# Patient Record
Sex: FEMALE | Race: WHITE | NOT HISPANIC OR LATINO | Employment: FULL TIME | URBAN - METROPOLITAN AREA
[De-identification: names, ages, dates, MRNs, and addresses within clinical notes are randomized per-mention and may not be internally consistent; named-entity substitution may affect disease eponyms.]

---

## 2022-08-16 ENCOUNTER — OFFICE VISIT (OUTPATIENT)
Dept: FAMILY MEDICINE CLINIC | Facility: CLINIC | Age: 47
End: 2022-08-16

## 2022-08-16 VITALS
HEIGHT: 65 IN | WEIGHT: 166 LBS | BODY MASS INDEX: 27.66 KG/M2 | SYSTOLIC BLOOD PRESSURE: 115 MMHG | OXYGEN SATURATION: 97 % | DIASTOLIC BLOOD PRESSURE: 72 MMHG | HEART RATE: 69 BPM

## 2022-08-16 DIAGNOSIS — Z13.220 SCREENING FOR LIPID DISORDERS: ICD-10-CM

## 2022-08-16 DIAGNOSIS — Z53.20 COLON CANCER SCREENING DECLINED: ICD-10-CM

## 2022-08-16 DIAGNOSIS — Z01.89 ROUTINE LAB DRAW: ICD-10-CM

## 2022-08-16 DIAGNOSIS — Z85.3 HISTORY OF BREAST CANCER: Primary | ICD-10-CM

## 2022-08-16 DIAGNOSIS — Z13.29 SCREENING FOR THYROID DISORDER: ICD-10-CM

## 2022-08-16 DIAGNOSIS — Z76.89 ENCOUNTER TO ESTABLISH CARE: ICD-10-CM

## 2022-08-16 PROCEDURE — 99204 OFFICE O/P NEW MOD 45 MIN: CPT | Performed by: NURSE PRACTITIONER

## 2022-08-16 RX ORDER — TAMOXIFEN CITRATE 10 MG/1
10 TABLET ORAL 2 TIMES DAILY
COMMUNITY
End: 2022-08-16 | Stop reason: SDUPTHER

## 2022-08-16 RX ORDER — TAMOXIFEN CITRATE 10 MG/1
10 TABLET ORAL 2 TIMES DAILY
Qty: 60 TABLET | Refills: 0 | Status: SHIPPED | OUTPATIENT
Start: 2022-08-16 | End: 2022-09-14 | Stop reason: SDUPTHER

## 2022-08-16 NOTE — ASSESSMENT & PLAN NOTE
Discussed with patient we will go ahead and give her a 30-day refill of the tamoxifen so that she does not run out of medication before seeing the new oncologist.  Referral placed today.

## 2022-08-16 NOTE — PROGRESS NOTES
"Chief Complaint  Establish Care, breast cancer     SUBJECTIVE  Digna Baron presents to [facility]       History of Present Illness   Patient presents today to establish care.  States she recently moved here from [place].  Dx with breast CA 2018- had bilateral mastectomy, had one positive lymph node, had 4 rounds chemo, last in 2019, has bilat breast implants.     Last ONC f/u was 2022. Needs referral to new oncology doc., patient is also requesting a refill on her tamoxifen, she will be out in a few days.      Past Medical History:   Diagnosis Date   • Cancer (HCC) 2018    Breast      Family History   Problem Relation Age of Onset   • Drug abuse Father         When he was very young. He's been clean most of my life   • Cancer Maternal Grandfather         Bladder cancer   • Diabetes Maternal Grandfather         Extreme kidney failure. One at 0% function   • Heart disease Maternal Grandfather         Heart attack when he was 50   • Kidney disease Maternal Grandfather    • Cancer Paternal Grandmother         Breast cancer   • Cancer Paternal Aunt         Gall bladder cancer   • Early death Paternal Aunt         Passed from the cancer at 39 yo   • Thyroid disease Daughter         Hypothyroidism      Past Surgical History:   Procedure Laterality Date   • BREAST SURGERY  2018    Bilateral Mastectomy and implant exchange in May 2019   •  SECTION  1996    Daughter's Birth   • LYMPH NODE BIOPSY  2018    Breast cancer        Current Outpatient Medications:   •  tamoxifen (NOLVADEX) 10 MG tablet, Take 1 tablet by mouth 2 (Two) Times a Day., Disp: 60 tablet, Rfl: 0    OBJECTIVE  Vital Signs:   /72 (BP Location: Right arm, Patient Position: Sitting, Cuff Size: Adult)   Pulse 69   Ht 165.1 cm (65\")   Wt 75.3 kg (166 lb)   LMP 2019 (Exact Date)   SpO2 97%   BMI 27.62 kg/m²    Estimated body mass index is 27.62 kg/m² as calculated " "from the following:    Height as of this encounter: 165.1 cm (65\").    Weight as of this encounter: 75.3 kg (166 lb).     Wt Readings from Last 3 Encounters:   08/16/22 75.3 kg (166 lb)     BP Readings from Last 3 Encounters:   08/16/22 115/72       Physical Exam  Vitals reviewed.   Constitutional:       Appearance: Normal appearance. She is well-developed.   HENT:      Head: Normocephalic and atraumatic.      Right Ear: External ear normal.      Left Ear: External ear normal.   Eyes:      Conjunctiva/sclera: Conjunctivae normal.      Pupils: Pupils are equal, round, and reactive to light.   Cardiovascular:      Rate and Rhythm: Normal rate and regular rhythm.      Heart sounds: No murmur heard.    No friction rub. No gallop.   Pulmonary:      Effort: Pulmonary effort is normal.      Breath sounds: Normal breath sounds. No wheezing or rhonchi.   Skin:     General: Skin is warm and dry.   Neurological:      Mental Status: She is alert and oriented to person, place, and time.      Cranial Nerves: No cranial nerve deficit.   Psychiatric:         Mood and Affect: Mood and affect normal.         Behavior: Behavior normal.         Thought Content: Thought content normal.         Judgment: Judgment normal.          Result Review                      No Images in the past 120 days found..     The above data has been reviewed by POLA Campbell 08/16/2022 08:11 EDT.          Patient Care Team:  Brenda Ying APRN as PCP - General (Nurse Practitioner)      ASSESSMENT & PLAN    Diagnoses and all orders for this visit:    1. History of breast cancer (Primary)  Assessment & Plan:  Discussed with patient we will go ahead and give her a 30-day refill of the tamoxifen so that she does not run out of medication before seeing the new oncologist.  Referral placed today.    Orders:  -     Ambulatory Referral to Oncology  -     tamoxifen (NOLVADEX) 10 MG tablet; Take 1 tablet by mouth 2 (Two) Times a Day.  Dispense: 60 " tablet; Refill: 0    2. Screening for thyroid disorder  -     TSH; Future    3. Routine lab draw  -     CBC w AUTO Differential; Future  -     Comprehensive metabolic panel; Future    4. Screening for lipid disorders  -     Lipid panel; Future    5. Encounter to establish care  -     CBC w AUTO Differential; Future  -     Lipid panel; Future  -     Comprehensive metabolic panel; Future  -     TSH; Future    6. Colon cancer screening declined  Comments:  declines colon cancer screening today, states that she will have her insurance starting 1 September and will contact us at that time to get order for Cologaurd        Tobacco Use: Medium Risk   • Smoking Tobacco Use: Former Smoker   • Smokeless Tobacco Use: Never Used       Follow Up     Return if symptoms worsen or fail to improve.        Patient was given instructions and counseling regarding her condition or for health maintenance advice. Please see specific information pulled into the AVS if appropriate.   I have reviewed information obtained and documented by others and I have confirmed the accuracy of this documented note.    POLA Campbell

## 2022-08-25 ENCOUNTER — TELEPHONE (OUTPATIENT)
Dept: ONCOLOGY | Facility: OTHER | Age: 47
End: 2022-08-25

## 2022-08-25 NOTE — TELEPHONE ENCOUNTER
PT CALLED TO SET UP NP APPT, TRIED TO W/T WAS TRANS TO REFERRAL VM LEFT MESSAGE FOR THEM TO CALL THE PT BACK TO [facility] AFTER 9/1.

## 2022-09-14 ENCOUNTER — CONSULT (OUTPATIENT)
Dept: ONCOLOGY | Facility: HOSPITAL | Age: 47
End: 2022-09-14

## 2022-09-14 VITALS
HEIGHT: 65 IN | SYSTOLIC BLOOD PRESSURE: 113 MMHG | TEMPERATURE: 98.8 F | WEIGHT: 161.38 LBS | HEART RATE: 57 BPM | DIASTOLIC BLOOD PRESSURE: 54 MMHG | RESPIRATION RATE: 16 BRPM | OXYGEN SATURATION: 97 % | BODY MASS INDEX: 26.89 KG/M2

## 2022-09-14 DIAGNOSIS — Z17.0 MALIGNANT NEOPLASM OF LOWER-OUTER QUADRANT OF LEFT BREAST OF FEMALE, ESTROGEN RECEPTOR POSITIVE: Primary | ICD-10-CM

## 2022-09-14 DIAGNOSIS — Z90.13 ACQUIRED ABSENCE OF BILATERAL BREASTS AND NIPPLES: ICD-10-CM

## 2022-09-14 DIAGNOSIS — R91.1 SOLITARY PULMONARY NODULE: ICD-10-CM

## 2022-09-14 DIAGNOSIS — C50.512 MALIGNANT NEOPLASM OF LOWER-OUTER QUADRANT OF LEFT BREAST OF FEMALE, ESTROGEN RECEPTOR POSITIVE: Primary | ICD-10-CM

## 2022-09-14 DIAGNOSIS — Z85.3 HISTORY OF BREAST CANCER: ICD-10-CM

## 2022-09-14 PROBLEM — C77.9 MALIGNANT NEOPLASM METASTATIC TO LYMPH NODES: Status: ACTIVE | Noted: 2018-12-28

## 2022-09-14 PROBLEM — Z30.40 ENCOUNTER FOR SURVEILLANCE OF CONTRACEPTIVES, UNSPECIFIED: Status: ACTIVE | Noted: 2017-01-30

## 2022-09-14 PROBLEM — Z91.419: Status: ACTIVE | Noted: 2017-01-30

## 2022-09-14 PROCEDURE — 99214 OFFICE O/P EST MOD 30 MIN: CPT | Performed by: INTERNAL MEDICINE

## 2022-09-14 PROCEDURE — G0463 HOSPITAL OUTPT CLINIC VISIT: HCPCS | Performed by: INTERNAL MEDICINE

## 2022-09-14 RX ORDER — GABAPENTIN 100 MG/1
1 CAPSULE ORAL 3 TIMES DAILY
COMMUNITY
Start: 2021-10-22 | End: 2022-09-14 | Stop reason: SDUPTHER

## 2022-09-14 RX ORDER — OXYBUTYNIN CHLORIDE 5 MG/1
1 TABLET, EXTENDED RELEASE ORAL DAILY
COMMUNITY
Start: 2022-03-22 | End: 2022-09-14 | Stop reason: SDUPTHER

## 2022-09-14 RX ORDER — MULTIVIT WITH MINERALS/LUTEIN
250 TABLET ORAL DAILY
COMMUNITY

## 2022-09-14 RX ORDER — TAMOXIFEN CITRATE 10 MG/1
20 TABLET ORAL DAILY
Qty: 90 TABLET | Refills: 1 | Status: SHIPPED | OUTPATIENT
Start: 2022-09-14 | End: 2022-11-13 | Stop reason: SDUPTHER

## 2022-09-15 ENCOUNTER — PATIENT ROUNDING (BHMG ONLY) (OUTPATIENT)
Dept: ONCOLOGY | Facility: HOSPITAL | Age: 47
End: 2022-09-15

## 2022-09-15 NOTE — PROGRESS NOTES
September 15, 2022    Hello, may I speak with Digna Baron?    My name is Freida.    I am  with [facility]  [address] [place] [address] [address]  [place] [address] [phone].    Before we get started may I verify your date of birth? 1975    I am calling to officially welcome you to our practice and ask about your recent visit. Is this a good time to talk? NO- Left Voicemail    Tell me about your visit with us. What things went well?       We're always looking for ways to make our patients' experiences even better. Do you have recommendations on ways we may improve?  NO- Left Voicemail    Overall were you satisfied with your first visit to our practice? NO- Left Voicemail       I appreciate you taking the time to speak with me today. Is there anything else I can do for you? NO- Left Voicemail      Thank you, and have a great day.

## 2022-10-17 ENCOUNTER — LAB (OUTPATIENT)
Dept: LAB | Facility: HOSPITAL | Age: 47
End: 2022-10-17

## 2022-10-17 DIAGNOSIS — Z13.29 SCREENING FOR THYROID DISORDER: ICD-10-CM

## 2022-10-17 DIAGNOSIS — Z13.220 SCREENING FOR LIPID DISORDERS: ICD-10-CM

## 2022-10-17 DIAGNOSIS — Z01.89 ROUTINE LAB DRAW: ICD-10-CM

## 2022-10-17 DIAGNOSIS — Z76.89 ENCOUNTER TO ESTABLISH CARE: ICD-10-CM

## 2022-10-17 LAB
ALBUMIN SERPL-MCNC: 4.1 G/DL (ref 3.5–5.2)
ALBUMIN/GLOB SERPL: 1.7 G/DL
ALP SERPL-CCNC: 65 U/L (ref 39–117)
ALT SERPL W P-5'-P-CCNC: 28 U/L (ref 1–33)
ANION GAP SERPL CALCULATED.3IONS-SCNC: 7.2 MMOL/L (ref 5–15)
AST SERPL-CCNC: 30 U/L (ref 1–32)
BASOPHILS # BLD AUTO: 0.05 10*3/MM3 (ref 0–0.2)
BASOPHILS NFR BLD AUTO: 0.7 % (ref 0–1.5)
BILIRUB SERPL-MCNC: <0.2 MG/DL (ref 0–1.2)
BUN SERPL-MCNC: 13 MG/DL (ref 6–20)
BUN/CREAT SERPL: 14 (ref 7–25)
CALCIUM SPEC-SCNC: 9.3 MG/DL (ref 8.6–10.5)
CHLORIDE SERPL-SCNC: 105 MMOL/L (ref 98–107)
CHOLEST SERPL-MCNC: 151 MG/DL (ref 0–200)
CO2 SERPL-SCNC: 27.8 MMOL/L (ref 22–29)
CREAT SERPL-MCNC: 0.93 MG/DL (ref 0.57–1)
DEPRECATED RDW RBC AUTO: 44 FL (ref 37–54)
EGFRCR SERPLBLD CKD-EPI 2021: 76.4 ML/MIN/1.73
EOSINOPHIL # BLD AUTO: 0.15 10*3/MM3 (ref 0–0.4)
EOSINOPHIL NFR BLD AUTO: 2 % (ref 0.3–6.2)
ERYTHROCYTE [DISTWIDTH] IN BLOOD BY AUTOMATED COUNT: 12.5 % (ref 12.3–15.4)
GLOBULIN UR ELPH-MCNC: 2.4 GM/DL
GLUCOSE SERPL-MCNC: 100 MG/DL (ref 65–99)
HCT VFR BLD AUTO: 39.7 % (ref 34–46.6)
HDLC SERPL-MCNC: 96 MG/DL (ref 40–60)
HGB BLD-MCNC: 13 G/DL (ref 12–15.9)
IMM GRANULOCYTES # BLD AUTO: 0.03 10*3/MM3 (ref 0–0.05)
IMM GRANULOCYTES NFR BLD AUTO: 0.4 % (ref 0–0.5)
LDLC SERPL CALC-MCNC: 42 MG/DL (ref 0–100)
LDLC/HDLC SERPL: 0.44 {RATIO}
LYMPHOCYTES # BLD AUTO: 2.03 10*3/MM3 (ref 0.7–3.1)
LYMPHOCYTES NFR BLD AUTO: 27.6 % (ref 19.6–45.3)
MCH RBC QN AUTO: 31.2 PG (ref 26.6–33)
MCHC RBC AUTO-ENTMCNC: 32.7 G/DL (ref 31.5–35.7)
MCV RBC AUTO: 95.2 FL (ref 79–97)
MONOCYTES # BLD AUTO: 0.4 10*3/MM3 (ref 0.1–0.9)
MONOCYTES NFR BLD AUTO: 5.4 % (ref 5–12)
NEUTROPHILS NFR BLD AUTO: 4.7 10*3/MM3 (ref 1.7–7)
NEUTROPHILS NFR BLD AUTO: 63.9 % (ref 42.7–76)
NRBC BLD AUTO-RTO: 0 /100 WBC (ref 0–0.2)
PLATELET # BLD AUTO: 159 10*3/MM3 (ref 140–450)
PMV BLD AUTO: 11.2 FL (ref 6–12)
POTASSIUM SERPL-SCNC: 3.7 MMOL/L (ref 3.5–5.2)
PROT SERPL-MCNC: 6.5 G/DL (ref 6–8.5)
RBC # BLD AUTO: 4.17 10*6/MM3 (ref 3.77–5.28)
SODIUM SERPL-SCNC: 140 MMOL/L (ref 136–145)
TRIGL SERPL-MCNC: 62 MG/DL (ref 0–150)
TSH SERPL DL<=0.05 MIU/L-ACNC: 2.39 UIU/ML (ref 0.27–4.2)
VLDLC SERPL-MCNC: 13 MG/DL (ref 5–40)
WBC NRBC COR # BLD: 7.36 10*3/MM3 (ref 3.4–10.8)

## 2022-10-17 PROCEDURE — 36415 COLL VENOUS BLD VENIPUNCTURE: CPT

## 2022-10-17 PROCEDURE — 80061 LIPID PANEL: CPT

## 2022-10-17 PROCEDURE — 80050 GENERAL HEALTH PANEL: CPT

## 2022-10-18 ENCOUNTER — TELEPHONE (OUTPATIENT)
Dept: FAMILY MEDICINE CLINIC | Facility: CLINIC | Age: 47
End: 2022-10-18

## 2022-10-18 ENCOUNTER — LAB (OUTPATIENT)
Dept: LAB | Facility: HOSPITAL | Age: 47
End: 2022-10-18

## 2022-10-18 DIAGNOSIS — R73.09 ELEVATED GLUCOSE: Primary | ICD-10-CM

## 2022-10-18 DIAGNOSIS — R73.09 ELEVATED GLUCOSE: ICD-10-CM

## 2022-10-18 DIAGNOSIS — Z12.11 COLON CANCER SCREENING: Primary | ICD-10-CM

## 2022-10-18 LAB — HBA1C MFR BLD: 5.2 % (ref 4.8–5.6)

## 2022-10-18 PROCEDURE — 83036 HEMOGLOBIN GLYCOSYLATED A1C: CPT

## 2022-10-18 NOTE — TELEPHONE ENCOUNTER
----- Message from POLA Nguyễn sent at 10/18/2022  7:09 AM EDT -----  Labs look good overall, mildly elevated glucose, please add an A1c, her previous office visit we discussed that we would order a Cologuard or colonoscopy once the patient's insurance kicks in, would she like me to order that now?

## 2022-11-13 DIAGNOSIS — Z85.3 HISTORY OF BREAST CANCER: ICD-10-CM

## 2022-11-14 DIAGNOSIS — Z85.3 HISTORY OF BREAST CANCER: ICD-10-CM

## 2022-11-14 RX ORDER — TAMOXIFEN CITRATE 10 MG/1
20 TABLET ORAL DAILY
Qty: 90 TABLET | Refills: 1 | OUTPATIENT
Start: 2022-11-14

## 2022-11-14 RX ORDER — TAMOXIFEN CITRATE 10 MG/1
20 TABLET ORAL DAILY
Qty: 90 TABLET | Refills: 1 | Status: SHIPPED | OUTPATIENT
Start: 2022-11-14 | End: 2023-01-01 | Stop reason: SDUPTHER

## 2023-01-01 DIAGNOSIS — Z85.3 HISTORY OF BREAST CANCER: ICD-10-CM

## 2023-01-03 RX ORDER — TAMOXIFEN CITRATE 10 MG/1
20 TABLET ORAL DAILY
Qty: 90 TABLET | Refills: 1 | Status: SHIPPED | OUTPATIENT
Start: 2023-01-03 | End: 2023-02-12 | Stop reason: SDUPTHER

## 2023-01-19 ENCOUNTER — PREP FOR SURGERY (OUTPATIENT)
Dept: OTHER | Facility: HOSPITAL | Age: 48
End: 2023-01-19
Payer: COMMERCIAL

## 2023-01-19 ENCOUNTER — OFFICE VISIT (OUTPATIENT)
Dept: SURGERY | Facility: CLINIC | Age: 48
End: 2023-01-19
Payer: COMMERCIAL

## 2023-01-19 VITALS
HEART RATE: 65 BPM | BODY MASS INDEX: 27.92 KG/M2 | OXYGEN SATURATION: 95 % | WEIGHT: 167.6 LBS | RESPIRATION RATE: 12 BRPM | HEIGHT: 65 IN

## 2023-01-19 DIAGNOSIS — Z12.11 SCREENING FOR MALIGNANT NEOPLASM OF COLON: Primary | ICD-10-CM

## 2023-01-19 DIAGNOSIS — Z85.3 HISTORY OF BREAST CANCER: ICD-10-CM

## 2023-01-19 PROCEDURE — S0285 CNSLT BEFORE SCREEN COLONOSC: HCPCS | Performed by: NURSE PRACTITIONER

## 2023-01-19 RX ORDER — CHLORAL HYDRATE 500 MG
CAPSULE ORAL
COMMUNITY

## 2023-01-19 RX ORDER — SOD SULF/POT CHLORIDE/MAG SULF 1.479 G
24 TABLET ORAL ONCE
Qty: 24 TABLET | Refills: 0 | Status: SHIPPED | OUTPATIENT
Start: 2023-01-19 | End: 2023-01-19

## 2023-01-19 NOTE — PROGRESS NOTES
Chief Complaint: Colonoscopy Consult    Subjective      Colonoscopy consultation       History of Present Illness  Digna Baron is a 47 y.o. female presents to [facility] for colonoscopy consultation.    Patient presents today on referral from Bee Xavier for colonoscopy consultation.  Patient denies any abdominal pain, change in bowel habit, or rectal bleeding.    Denies any family history of colorectal cancer.    No previous colonoscopy.    Objective     Past Medical History:   Diagnosis Date   • Breast cancer (HCC)    • Cancer (HCC) 2018    Breast       Past Surgical History:   Procedure Laterality Date   • BREAST SURGERY  2018    Bilateral Mastectomy and implant exchange in May 2019   •  SECTION  1996    Daughter's Birth   • LYMPH NODE BIOPSY  2018    Breast cancer       Outpatient Medications Marked as Taking for the 23 encounter (Office Visit) with Tatiana Delaney APRN   Medication Sig Dispense Refill   • BIOTIN FORTE PO Take  by mouth.     • Ferrous Sulfate Dried (FERROUS SULFATE IRON PO) Take  by mouth.     • Multiple Vitamins-Minerals (ZINC PO) Take  by mouth.     • Omega-3 Fatty Acids (fish oil) 1000 MG capsule capsule Take  by mouth Daily With Breakfast.     • tamoxifen (NOLVADEX) 10 MG tablet Take 2 tablets by mouth Daily. 90 tablet 1   • vitamin C (ASCORBIC ACID) 250 MG tablet Take 250 mg by mouth Daily.     • Zinc Acetate, Oral, (ZINC ACETATE PO) Take  by mouth.         No Known Allergies     Family History   Problem Relation Age of Onset   • Drug abuse Father         When he was very young. He's been clean most of my life   • Cancer Maternal Grandfather         Bladder cancer   • Diabetes Maternal Grandfather         Extreme kidney failure. One at 0% function   • Heart disease Maternal Grandfather         Heart attack when he was 50   • Kidney disease Maternal Grandfather    • Cancer Paternal Grandmother         Breast  "cancer   • Cancer Paternal Aunt         Gall bladder cancer   • Early death Paternal Aunt         Passed from the cancer at 41 yo   • Thyroid disease Daughter         Hypothyroidism       Social History     Socioeconomic History   • Marital status:    Tobacco Use   • Smoking status: Former     Packs/day: 1.00     Years: 20.00     Pack years: 20.00     Types: Cigarettes     Start date: 5/1/1992     Quit date: 7/16/2012     Years since quitting: 10.5   • Smokeless tobacco: Never   Vaping Use   • Vaping Use: Never used   Substance and Sexual Activity   • Alcohol use: Not Currently     Comment: Heavy   • Drug use: Yes     Types: Hashish, Marijuana, 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   Gastrointestinal: Negative for abdominal distention, abdominal pain, anal bleeding, blood in stool, constipation, diarrhea and rectal pain.        Vital Signs:   Pulse 65   Resp 12   Ht 165.1 cm (65\")   Wt 76 kg (167 lb 9.6 oz)   SpO2 95%   BMI 27.89 kg/m²      Physical Exam  Vitals and nursing note reviewed.   Constitutional:       General: She is not in acute distress.     Appearance: Normal appearance.   HENT:      Head: Normocephalic.   Cardiovascular:      Rate and Rhythm: Normal rate.   Pulmonary:      Effort: Pulmonary effort is normal.      Breath sounds: No stridor.   Abdominal:      Palpations: Abdomen is soft.      Tenderness: There is no guarding.   Musculoskeletal:         General: No deformity. Normal range of motion.   Skin:     General: Skin is warm and dry.      Coloration: Skin is not jaundiced.   Neurological:      General: No focal deficit present.      Mental Status: She is alert and oriented to person, place, and time.   Psychiatric:         Mood and Affect: Mood normal.         Thought Content: Thought content " normal.          Result Review :          []  Laboratory  []  Radiology  [x]  Pathology  []  Microbiology  []  EKG/Telemetry   []  Cardiology/Vascular   [x]  Old records  Today I reviewed Bee Xavier's previous office note.     Assessment and Plan    Diagnoses and all orders for this visit:    1. Screening for malignant neoplasm of colon (Primary)    2. History of breast cancer    Other orders  -     Sodium Sulfate-Mag Sulfate-KCl (Sutab) 1245-616-253 MG tablet; Take 24 tablets by mouth 1 (One) Time for 1 dose. Take as directed. Directions given in office  Dispense: 24 tablet; Refill: 0        Follow Up   Return for Schedule colonoscopy with Dr. Bahena on 5/19/2023 at [facility].     Phone PAT.  Hospital call with arrival time.    Possible risks/complications, benefits, and alternatives to surgical or invasive procedure have been explained to patient and/or legal guardian.     Patient has been evaluated and can tolerate anesthesia and/or sedation. Risks, benefits, and alternatives to anesthesia and sedation have been explained to patient and/or legal guardian.  Patient verbalizes understanding and is willing to proceed with above plan.     Patient was given instructions and counseling regarding her condition or for health maintenance advice. Please see specific information pulled into the AVS if appropriate.     The office note was dictated with the help of the dragon dictation system.

## 2023-02-12 DIAGNOSIS — Z85.3 HISTORY OF BREAST CANCER: ICD-10-CM

## 2023-02-13 RX ORDER — TAMOXIFEN CITRATE 10 MG/1
20 TABLET ORAL DAILY
Qty: 90 TABLET | Refills: 1 | Status: SHIPPED | OUTPATIENT
Start: 2023-02-13

## 2023-02-25 ENCOUNTER — PATIENT MESSAGE (OUTPATIENT)
Dept: FAMILY MEDICINE CLINIC | Facility: CLINIC | Age: 48
End: 2023-02-25
Payer: COMMERCIAL

## 2023-02-27 ENCOUNTER — TELEPHONE (OUTPATIENT)
Dept: FAMILY MEDICINE CLINIC | Facility: CLINIC | Age: 48
End: 2023-02-27
Payer: COMMERCIAL

## 2023-02-27 NOTE — TELEPHONE ENCOUNTER
----- Message from Daria Collins MA sent at 2/27/2023  6:36 AM EST -----  Regarding: FW: Weight Loss  Contact: [phone]    ----- Message -----  From: Digna Baron  Sent: 2/25/2023  10:28 AM EST  To: [facility] Clinical Pool  Subject: Weight Loss                                      Meant to say #now# doing intermittent fasting. I also work out at least 30 minutes 5 days a week.

## 2023-02-27 NOTE — TELEPHONE ENCOUNTER
Please let the patient know that these concerns cannot be addressed via messaging, an appointment will be required

## 2023-03-06 ENCOUNTER — OFFICE VISIT (OUTPATIENT)
Dept: FAMILY MEDICINE CLINIC | Facility: CLINIC | Age: 48
End: 2023-03-06
Payer: COMMERCIAL

## 2023-03-06 VITALS
WEIGHT: 170 LBS | OXYGEN SATURATION: 100 % | SYSTOLIC BLOOD PRESSURE: 105 MMHG | DIASTOLIC BLOOD PRESSURE: 55 MMHG | HEIGHT: 65 IN | BODY MASS INDEX: 28.32 KG/M2 | HEART RATE: 64 BPM

## 2023-03-06 DIAGNOSIS — R63.5 WEIGHT GAIN: Primary | ICD-10-CM

## 2023-03-06 DIAGNOSIS — E66.3 OVERWEIGHT (BMI 25.0-29.9): ICD-10-CM

## 2023-03-06 PROCEDURE — 99214 OFFICE O/P EST MOD 30 MIN: CPT | Performed by: NURSE PRACTITIONER

## 2023-03-06 RX ORDER — TOPIRAMATE 25 MG/1
TABLET ORAL
Qty: 60 TABLET | Refills: 2 | Status: SHIPPED | OUTPATIENT
Start: 2023-03-06

## 2023-03-06 NOTE — PROGRESS NOTES
Chief Complaint  Weight Gain    SUBJECTIVE  Digna Baron presents to [facility]     Pt would like to discuss options for weight loss. States she has gained about 30lbs over the last year. previously did well with nutrisystem, lost about 40lbs several years ago, but has tried several things now to get if back off with no success.     Patient admits that she struggles with overeating, states that she tries to restrict herself, but despite this efforts still finds herself taking in more calories than she knows she should.    States she is unable to afford anything like Nutrisystem now due to different job      Colonoscopy scheduled for 23    Pt is due for a pap smear and will schedule within the office.     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 He's been clean most of my life   • Cancer Maternal Grandfather         Bladder cancer   • Diabetes Maternal Grandfather         Extreme kidney failure. One at 0% function   • Heart disease Maternal Grandfather         Heart attack when he was 50   • Kidney disease Maternal Grandfather    • Cancer Paternal Grandmother         Breast cancer   • Cancer Paternal Aunt         Gall bladder cancer   • Early death Paternal Aunt         Passed from the cancer at 41 yo   • Thyroid disease Daughter         Hypothyroidism      Past Surgical History:   Procedure Laterality Date   • BREAST SURGERY  2018    Bilateral Mastectomy and implant exchange in May 2019   •  SECTION  1996    Daughter's Birth   • LYMPH NODE BIOPSY  2018    Breast cancer        Current Outpatient Medications:  "  •  BIOTIN FORTE PO, Take  by mouth., Disp: , Rfl:   •  Ferrous Sulfate Dried (FERROUS SULFATE IRON PO), Take  by mouth., Disp: , Rfl:   •  Multiple Vitamins-Minerals (ZINC PO), Take  by mouth., Disp: , Rfl:   •  Omega-3 Fatty Acids (fish oil) 1000 MG capsule capsule, Take  by mouth Daily With Breakfast., Disp: , Rfl:   •  tamoxifen (NOLVADEX) 10 MG tablet, Take 2 tablets by mouth Daily., Disp: 90 tablet, Rfl: 1  •  vitamin C (ASCORBIC ACID) 250 MG tablet, Take 1 tablet by mouth Daily., Disp: , Rfl:   •  Zinc Acetate, Oral, (ZINC ACETATE PO), Take  by mouth., Disp: , Rfl:   •  topiramate (Topamax) 25 MG tablet, 1 tab PO QD x 1 week, 1 tab PO BID thereafter, Disp: 60 tablet, Rfl: 2    OBJECTIVE  Vital Signs:   /55   Pulse 64   Ht 165.1 cm (65\")   Wt 77.1 kg (170 lb)   SpO2 100%   BMI 28.29 kg/m²    Estimated body mass index is 28.29 kg/m² as calculated from the following:    Height as of this encounter: 165.1 cm (65\").    Weight as of this encounter: 77.1 kg (170 lb).     Wt Readings from Last 3 Encounters:   03/06/23 77.1 kg (170 lb)   01/19/23 76 kg (167 lb 9.6 oz)   09/14/22 73.2 kg (161 lb 6 oz)     BP Readings from Last 3 Encounters:   03/06/23 105/55   09/14/22 113/54   08/16/22 115/72       Physical Exam  Vitals reviewed.   Constitutional:       Appearance: Normal appearance. She is well-developed.   HENT:      Head: Normocephalic and atraumatic.      Right Ear: External ear normal.      Left Ear: External ear normal.   Eyes:      Conjunctiva/sclera: Conjunctivae normal.      Pupils: Pupils are equal, round, and reactive to light.   Cardiovascular:      Rate and Rhythm: Normal rate and regular rhythm.      Heart sounds: No murmur heard.    No friction rub. No gallop.   Pulmonary:      Effort: Pulmonary effort is normal.      Breath sounds: Normal breath sounds. No wheezing or rhonchi.   Skin:     General: Skin is warm and dry.   Neurological:      Mental Status: She is alert and oriented to person, " place, and time.      Cranial Nerves: No cranial nerve deficit.   Psychiatric:         Mood and Affect: Mood and affect normal.         Behavior: Behavior normal.         Thought Content: Thought content normal.         Judgment: Judgment normal.          Result Review    CMP    CMP 10/17/22   Glucose 100 (A)   BUN 13   Creatinine 0.93   eGFR 76.4   Sodium 140   Potassium 3.7   Chloride 105   Calcium 9.3   Total Protein 6.5   Albumin 4.10   Globulin 2.4   Total Bilirubin <0.2   Alkaline Phosphatase 65   AST (SGOT) 30   ALT (SGPT) 28   Albumin/Globulin Ratio 1.7   BUN/Creatinine Ratio 14.0   Anion Gap 7.2   (A) Abnormal value       Comments are available for some flowsheets but are not being displayed.           CBC    CBC 10/17/22   WBC 7.36   RBC 4.17   Hemoglobin 13.0   Hematocrit 39.7   MCV 95.2   MCH 31.2   MCHC 32.7   RDW 12.5   Platelets 159           Lipid Panel    Lipid Panel 10/17/22   Total Cholesterol 151   Triglycerides 62   HDL Cholesterol 96 (A)   VLDL Cholesterol 13   LDL Cholesterol  42   LDL/HDL Ratio 0.44   (A) Abnormal value            TSH    TSH 10/17/22   TSH 2.390             No Images in the past 120 days found..     The above data has been reviewed by POLA Campbell 03/06/2023 11:55 EST.          Patient Care Team:  Brenda Ying APRN as PCP - General (Nurse Practitioner)    BMI is >= 25 and <30. (Overweight) The following options were offered after discussion;: exercise counseling/recommendations and nutrition counseling/recommendations       ASSESSMENT & PLAN    Diagnoses and all orders for this visit:    1. Weight gain (Primary)    2. Overweight (BMI 25.0-29.9)  Assessment & Plan:  Patient complaining of difficulty with weight loss, has gained about 30 pounds in the last year, not applicable for medication such as Wegovy or Saxenda due to BMI, after discussion we have decided to trial Topamax to help with appetite suppression, side effects administration medication discussed  at length, patient will follow-up in 3 months for reevaluation    Orders:  -     topiramate (Topamax) 25 MG tablet; 1 tab PO QD x 1 week, 1 tab PO BID thereafter  Dispense: 60 tablet; Refill: 2       Tobacco Use: Medium Risk   • Smoking Tobacco Use: Former   • Smokeless Tobacco Use: Never   • Passive Exposure: Not on file       Follow Up     Return if symptoms worsen or fail to improve.        Patient was given instructions and counseling regarding her condition or for health maintenance advice. Please see specific information pulled into the AVS if appropriate.   I have reviewed information obtained and documented by others and I have confirmed the accuracy of this documented note.    POLA Campbell

## 2023-03-06 NOTE — ASSESSMENT & PLAN NOTE
Patient complaining of difficulty with weight loss, has gained about 30 pounds in the last year, not applicable for medication such as Wegovy or Saxenda due to BMI, after discussion we have decided to trial Topamax to help with appetite suppression, side effects administration medication discussed at length, patient will follow-up in 3 months for reevaluation

## 2023-03-07 ENCOUNTER — TELEPHONE (OUTPATIENT)
Dept: ONCOLOGY | Facility: HOSPITAL | Age: 48
End: 2023-03-07

## 2023-03-07 NOTE — TELEPHONE ENCOUNTER
Caller: Digna Baron    Relationship to patient: Self    Best call back number: [phone]    Type of visit: FOLLOW UP    Requested date: PLEASE CALL TO R/S.    If rescheduling, when is the original appointment: 03/14

## 2023-03-15 ENCOUNTER — OFFICE VISIT (OUTPATIENT)
Dept: ONCOLOGY | Facility: HOSPITAL | Age: 48
End: 2023-03-15
Payer: COMMERCIAL

## 2023-03-15 VITALS
WEIGHT: 167.33 LBS | DIASTOLIC BLOOD PRESSURE: 73 MMHG | OXYGEN SATURATION: 100 % | BODY MASS INDEX: 27.88 KG/M2 | HEART RATE: 60 BPM | RESPIRATION RATE: 16 BRPM | TEMPERATURE: 97.3 F | HEIGHT: 65 IN | SYSTOLIC BLOOD PRESSURE: 102 MMHG

## 2023-03-15 DIAGNOSIS — C50.512 MALIGNANT NEOPLASM OF LOWER-OUTER QUADRANT OF LEFT BREAST OF FEMALE, ESTROGEN RECEPTOR POSITIVE: Primary | ICD-10-CM

## 2023-03-15 DIAGNOSIS — Z17.0 MALIGNANT NEOPLASM OF LOWER-OUTER QUADRANT OF LEFT BREAST OF FEMALE, ESTROGEN RECEPTOR POSITIVE: Primary | ICD-10-CM

## 2023-03-15 PROCEDURE — G0463 HOSPITAL OUTPT CLINIC VISIT: HCPCS | Performed by: INTERNAL MEDICINE

## 2023-03-15 PROCEDURE — 99214 OFFICE O/P EST MOD 30 MIN: CPT | Performed by: INTERNAL MEDICINE

## 2023-03-15 NOTE — PROGRESS NOTES
Patient  Digna Baron    Location  [facility] HEMATOLOGY & ONCOLOGY    Chief Complaint  FHx Breast Cancer    Referring Provider: POLA Nguyễn  PCP: Brenda Ying APRN    Subjective          Oncology/Hematology History Overview Note     ER+ Left Breast Cancer:    - diagnosed at age 44  - Bilateral skin sparing mastectomy and left SLNB on 12/21/18: invastive ductal carcinoma of the left breast, grade 2, 12mm focus, negative margins, closest was 4mm, not associated with DCIS, 1 of 2 LN with micrometastatic disease, pT1c pN1mi (stage IA), ER+, NC+, HER2 negative  - treatment with adjuvant chemotherapy with TC but no radiation.   - on Tamoxifen since 2019     Malignant neoplasm metastatic to lymph nodes   12/28/2018 Initial Diagnosis    Malignant neoplasm metastatic to lymph nodes (HCC)     Malignant neoplasm of lower-outer quadrant of left female breast   12/6/2018 Initial Diagnosis    Malignant neoplasm of lower-outer quadrant of left female breast (HCC)         HPI  Patient comes in today for 6-month follow-up while on tamoxifen.  She has been on this medication since 2019 and has a history of bilateral mastectomy.  I reviewed possible side effects of tamoxifen.  She has hot flashes but they are very much tolerable.  She does question whether the medication could be contributing to depression.  I discussed various options such as starting additional medications, seeing therapist, and taking a break from the medication.    Review of Systems   Constitutional: Negative for appetite change, diaphoresis, fatigue, fever, unexpected weight gain and unexpected weight loss.   HENT: Negative for hearing loss, mouth sores, sore throat, swollen glands, trouble swallowing and voice change.    Eyes: Negative for blurred vision.   Respiratory: Negative for cough, shortness of breath and wheezing.    Cardiovascular: Negative for chest pain and palpitations.   Gastrointestinal: Negative for  abdominal pain, blood in stool, constipation, diarrhea, nausea and vomiting.   Endocrine: Negative for cold intolerance and heat intolerance.   Genitourinary: Negative for difficulty urinating, dysuria, frequency, hematuria and urinary incontinence.   Musculoskeletal: Negative for arthralgias, back pain and myalgias.   Skin: Negative for rash, skin lesions and wound.   Neurological: Negative for dizziness, seizures, weakness, numbness and headache.   Hematological: Does not bruise/bleed easily.   Psychiatric/Behavioral: Negative for depressed mood. The patient is not nervous/anxious.    All other systems reviewed and are negative.      Past Medical History:   Diagnosis Date   • Anemia     Slightly anemic throughout my life   • Breast cancer    • Cancer 2018    Breast   • Substance abuse     Have not had any mind or mood altering drugs since  and am in recovery     Past Surgical History:   Procedure Laterality Date   • BREAST SURGERY  2018    Bilateral Mastectomy and implant exchange in May 2019   •  SECTION  1996    Daughter's Birth   • LYMPH NODE BIOPSY  2018    Breast cancer     Social History     Socioeconomic History   • Marital status:    Tobacco Use   • Smoking status: Former     Packs/day: 1.00     Years: 20.00     Pack years: 20.00     Types: Cigarettes     Start date: 1992     Quit date: 2012     Years since quitting: 10.7   • Smokeless tobacco: Never   Vaping Use   • Vaping Use: Never used   Substance and Sexual Activity   • Alcohol use: Not Currently     Comment: Heavy   • Drug use: Yes     Types: Hashish, Marijuana, Methamphetamines     Comment: Clean from all drugs and alcohol since 10/2010   • Sexual activity: Yes     Partners: Male     Birth control/protection: Vasectomy     Family History   Problem Relation Age of Onset   • Drug abuse Father         When he was very young. He's been clean most of my life   • Cancer Maternal Grandfather          "Bladder cancer   • Diabetes Maternal Grandfather         Extreme kidney failure. One at 0% function   • Heart disease Maternal Grandfather         Heart attack when he was 50   • Kidney disease Maternal Grandfather    • Cancer Paternal Grandmother         Breast cancer   • Cancer Paternal Aunt         Gall bladder cancer   • Early death Paternal Aunt         Passed from the cancer at 41 yo   • Thyroid disease Daughter         Hypothyroidism       Objective   Physical Exam  General: Alert, cooperative, no acute distress  Eyes: Anicteric sclera, PERRLA  Respiratory: normal respiratory effort  Cardiovascular: no lower extremity edema  Skin: Normal tone, no rash, no lesions  Psychiatric: Appropriate affect, intact judgment  Neurologic: No focal sensory or motor deficits, normal cognition   Musculoskeletal: Normal muscle strength and tone  Extremities: No clubbing, cyanosis, or deformities    Vitals:    03/15/23 1110   BP: 102/73   Pulse: 60   Resp: 16   Temp: 97.3 °F (36.3 °C)   SpO2: 100%   Weight: 75.9 kg (167 lb 5.3 oz)   Height: 165.1 cm (65\")   PainSc: 0-No pain     ECOG score: 0         PHQ-9 Total Score:         Result Review :   The following data was reviewed by: Serena Maciel MD PhD on 03/15/2023:  Lab Results   Component Value Date    HGB 13.0 10/17/2022    HCT 39.7 10/17/2022    MCV 95.2 10/17/2022     10/17/2022    WBC 7.36 10/17/2022    NEUTROABS 4.70 10/17/2022    LYMPHSABS 2.03 10/17/2022    MONOSABS 0.40 10/17/2022    EOSABS 0.15 10/17/2022    BASOSABS 0.05 10/17/2022     Lab Results   Component Value Date    GLUCOSE 100 (H) 10/17/2022    BUN 13 10/17/2022    CREATININE 0.93 10/17/2022     10/17/2022    K 3.7 10/17/2022     10/17/2022    CO2 27.8 10/17/2022    CALCIUM 9.3 10/17/2022    PROTEINTOT 6.5 10/17/2022    ALBUMIN 4.10 10/17/2022    BILITOT <0.2 10/17/2022    ALKPHOS 65 10/17/2022    AST 30 10/17/2022    ALT 28 10/17/2022          Assessment and Plan    Diagnoses and all " orders for this visit:    1. History of breast cancer      ER+ Left Breast Cancer:  S/p bilateral skin sparing mastectomy and adjuvant TC.  Currently on Tamoxifen.  She is dealing with significant depression which could be exacerbated by tamoxifen.  She is agreeable to referral to behavioral health.  I recommend she hold tamoxifen for 1 to 2 weeks to see if her mood improves.  She may want to start an SSRI before restarting the medication.    Pulmonary nodule:  The pt reports a stable pulmonary nodule that was followed for many years.        Patient was given instructions and counseling regarding her condition or for health maintenance advice. Please see specific information pulled into the AVS if appropriate.

## 2023-03-30 ENCOUNTER — TELEPHONE (OUTPATIENT)
Dept: ONCOLOGY | Facility: HOSPITAL | Age: 48
End: 2023-03-30

## 2023-03-30 NOTE — TELEPHONE ENCOUNTER
Caller: Digna Baron    Relationship to patient: Self    Best call back number: [phone]    Patient is needing: TO REQUEST CALL FROM [facility].

## 2023-05-15 ENCOUNTER — PREP FOR SURGERY (OUTPATIENT)
Dept: OTHER | Facility: HOSPITAL | Age: 48
End: 2023-05-15
Payer: COMMERCIAL

## 2023-05-16 ENCOUNTER — OFFICE VISIT (OUTPATIENT)
Dept: ONCOLOGY | Facility: HOSPITAL | Age: 48
End: 2023-05-16
Payer: COMMERCIAL

## 2023-05-16 VITALS
RESPIRATION RATE: 16 BRPM | DIASTOLIC BLOOD PRESSURE: 67 MMHG | BODY MASS INDEX: 26.74 KG/M2 | SYSTOLIC BLOOD PRESSURE: 106 MMHG | HEART RATE: 67 BPM | TEMPERATURE: 97.3 F | HEIGHT: 65 IN | OXYGEN SATURATION: 99 % | WEIGHT: 160.5 LBS

## 2023-05-16 DIAGNOSIS — C50.512 MALIGNANT NEOPLASM OF LOWER-OUTER QUADRANT OF LEFT BREAST OF FEMALE, ESTROGEN RECEPTOR POSITIVE: Primary | ICD-10-CM

## 2023-05-16 DIAGNOSIS — Z17.0 MALIGNANT NEOPLASM OF LOWER-OUTER QUADRANT OF LEFT BREAST OF FEMALE, ESTROGEN RECEPTOR POSITIVE: Primary | ICD-10-CM

## 2023-05-16 DIAGNOSIS — R05.3 CHRONIC COUGH: ICD-10-CM

## 2023-05-16 DIAGNOSIS — M79.622 LEFT AXILLARY PAIN: ICD-10-CM

## 2023-05-16 PROCEDURE — 99214 OFFICE O/P EST MOD 30 MIN: CPT | Performed by: INTERNAL MEDICINE

## 2023-05-16 PROCEDURE — G0463 HOSPITAL OUTPT CLINIC VISIT: HCPCS | Performed by: INTERNAL MEDICINE

## 2023-05-16 NOTE — PROGRESS NOTES
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  She was feeling much better and crying less while off the medication.  She was also experiencing significant hot flashes.    Patient brings up a couple of new concerns.  She has recently noticed that she has a significant cough every time she laughs.  She does not notice a cough at any other time.  She wonders if this could be related to tamoxifen.    Patient has also noticed a nodule in her left axilla.  At times it is painful to palpation.    Review of Systems   Constitutional:  Negative for appetite change, diaphoresis, fatigue, fever, unexpected weight gain and unexpected weight loss.   HENT:  Negative for hearing loss, mouth sores, sore throat, swollen glands, trouble swallowing and voice change.    Eyes:  Negative for blurred vision.   Respiratory:  Negative for cough, shortness of breath and  wheezing.    Cardiovascular:  Negative for chest pain and palpitations.   Gastrointestinal:  Negative for abdominal pain, blood in stool, constipation, diarrhea, nausea and vomiting.   Endocrine: Negative for cold intolerance and heat intolerance.   Genitourinary:  Positive for breast pain (Lt axilla). Negative for difficulty urinating, dysuria, frequency, hematuria and urinary incontinence.   Musculoskeletal:  Negative for arthralgias, back pain and myalgias.   Skin:  Negative for rash, skin lesions and wound.   Neurological:  Negative for dizziness, seizures, weakness, numbness and headache.   Hematological:  Does not bruise/bleed easily.   Psychiatric/Behavioral:  Negative for depressed mood. The patient is not nervous/anxious.      Past Medical History:   Diagnosis Date    Anemia     Slightly anemic throughout my life    Breast cancer     Cancer 2018    Breast    Substance abuse     Have not had any mind or mood altering drugs since  and am in recovery     Past Surgical History:   Procedure Laterality Date    BREAST SURGERY  2018    Bilateral Mastectomy and implant exchange in May 2019     SECTION  1996    Daughter's Birth    COLONOSCOPY N/A 2023    Procedure: COLONOSCOPY;  Surgeon: Tylor Bahena MD;  Location: [facility] ENDOSCOPY;  Service: General;  Laterality: N/A;  normal colonoscopy    LYMPH NODE BIOPSY  2018    Breast cancer     Social History     Socioeconomic History    Marital status:    Tobacco Use    Smoking status: Former     Packs/day: 1.00     Years: 20.00     Pack years: 20.00     Types: Cigarettes     Start date: 1992     Quit date: 2012     Years since quitting: 10.9    Smokeless tobacco: Never   Vaping Use    Vaping Use: Never used   Substance and Sexual Activity    Alcohol use: Not Currently     Comment: Heavy    Drug use: Yes     Types: Hashish, Marijuana, Methamphetamines     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 He's been clean most of my life    Cancer Maternal Grandfather         Bladder cancer    Diabetes Maternal Grandfather         Extreme kidney failure. One at 0% function    Heart disease Maternal Grandfather         Heart attack when he was 50    Kidney disease Maternal Grandfather     Cancer Paternal Grandmother         Breast cancer    Cancer Paternal Aunt         Gall bladder cancer    Early death Paternal Aunt         Passed from the cancer at 39 yo    Thyroid disease Daughter         Hypothyroidism       Objective   Physical Exam  General: Alert, cooperative, no acute distress  Eyes: Anicteric sclera, PERRLA  Respiratory: normal respiratory effort  Cardiovascular: no lower extremity edema  Skin: Normal tone, no rash, no lesions  Psychiatric: Appropriate affect, intact judgment  Neurologic: No focal sensory or motor deficits, normal cognition   Musculoskeletal: Normal muscle strength and tone  Extremities: No clubbing, cyanosis, or deformities  Axilla: There is a palpable nodule in her left axilla, soft and mobile    Vitals:    05/16/23 1522   BP: 106/67   Pulse: 67   Resp: 16   Temp: 97.3 °F (36.3 °C)   SpO2: 99%   Weight: 72.8 kg (160 lb 7.9 oz)   Height: 165.1 cm (65\")   PainSc: 0-No pain     ECOG score: 0         PHQ-9 Total Score:         Result Review :   The following data was reviewed by: Serena Maciel MD PhD on 05/16/2023:  Lab Results   Component Value Date    HGB 13.0 10/17/2022    HCT 39.7 10/17/2022    MCV 95.2 10/17/2022     10/17/2022    WBC 7.36 10/17/2022    NEUTROABS 4.70 10/17/2022    LYMPHSABS 2.03 10/17/2022    MONOSABS 0.40 10/17/2022    EOSABS 0.15 10/17/2022    BASOSABS 0.05 10/17/2022     Lab Results   Component Value Date    GLUCOSE 100 (H) 10/17/2022    BUN 13 10/17/2022    CREATININE 0.93 " 10/17/2022     10/17/2022    K 3.7 10/17/2022     10/17/2022    CO2 27.8 10/17/2022    CALCIUM 9.3 10/17/2022    PROTEINTOT 6.5 10/17/2022    ALBUMIN 4.10 10/17/2022    BILITOT <0.2 10/17/2022    ALKPHOS 65 10/17/2022    AST 30 10/17/2022    ALT 28 10/17/2022     No results found for: IRON, LABIRON, TRANSFERRIN, TIBC  No results found for: LDH, FERRITIN, YARCPICO78, FOLATE  No results found for: PSA, CEA, AFP, ,        Assessment and Plan    Diagnoses and all orders for this visit:    1. Malignant neoplasm of lower-outer quadrant of left breast of female, estrogen receptor positive (Primary)  -     US Axilla Left; Future    2. Left axillary pain  -     US Axilla Left; Future    3. Chronic cough  -     Ambulatory Referral to Pulmonology        ER+ Left Breast Cancer:  S/p bilateral skin sparing mastectomy and adjuvant TC.  Depression symptoms were exacerbated by tamoxifen.  She will continue to hold the medication and start Zoloft before restarting the medication.  I will follow-up with her in 1 to 2 months.    Left axillary nodule and pain: I will send the patient for an ultrasound of her left axilla prior to follow-up.    Pulmonary nodule:  The pt reports a stable pulmonary nodule that was followed for many years.      Patient was given instructions and counseling regarding her condition or for health maintenance advice. Please see specific information pulled into the AVS if appropriate.     Serena Maciel MD PhD    6/19/2023

## 2023-05-17 NOTE — PRE-PROCEDURE INSTRUCTIONS
Left messeage Instructing  on arrival time, must have  18 or over, educated on diet and bowel prep instructions, instructed to bring current medications or current medication list, instructed on blood thinners, instructed to take any nebulizer txs prior to arrival and bring any inhalers with them, instructed to hold all morning medications prior to procedure, may take as prescribed by physician after procedure.

## 2023-05-19 ENCOUNTER — ANESTHESIA EVENT (OUTPATIENT)
Dept: GASTROENTEROLOGY | Facility: HOSPITAL | Age: 48
End: 2023-05-19
Payer: COMMERCIAL

## 2023-05-19 ENCOUNTER — HOSPITAL ENCOUNTER (OUTPATIENT)
Facility: HOSPITAL | Age: 48
Setting detail: HOSPITAL OUTPATIENT SURGERY
Discharge: HOME | End: 2023-05-19
Attending: SURGERY | Admitting: SURGERY
Payer: COMMERCIAL

## 2023-05-19 ENCOUNTER — ANESTHESIA (OUTPATIENT)
Dept: GASTROENTEROLOGY | Facility: HOSPITAL | Age: 48
End: 2023-05-19
Payer: COMMERCIAL

## 2023-05-19 VITALS
BODY MASS INDEX: 26.12 KG/M2 | RESPIRATION RATE: 20 BRPM | SYSTOLIC BLOOD PRESSURE: 118 MMHG | WEIGHT: 156.97 LBS | HEART RATE: 70 BPM | OXYGEN SATURATION: 94 % | DIASTOLIC BLOOD PRESSURE: 70 MMHG | TEMPERATURE: 97.8 F

## 2023-05-19 PROCEDURE — 25010000002 PROPOFOL 10 MG/ML EMULSION: Performed by: NURSE ANESTHETIST, CERTIFIED REGISTERED

## 2023-05-19 RX ORDER — PROPOFOL 10 MG/ML
VIAL (ML) INTRAVENOUS AS NEEDED
Status: DISCONTINUED | OUTPATIENT
Start: 2023-05-19 | End: 2023-05-19 | Stop reason: SURG

## 2023-05-19 RX ORDER — SODIUM CHLORIDE, SODIUM LACTATE, POTASSIUM CHLORIDE, CALCIUM CHLORIDE 600; 310; 30; 20 MG/100ML; MG/100ML; MG/100ML; MG/100ML
30 INJECTION, SOLUTION INTRAVENOUS CONTINUOUS
Status: DISCONTINUED | OUTPATIENT
Start: 2023-05-19 | End: 2023-05-19 | Stop reason: HOSPADM

## 2023-05-19 RX ORDER — LIDOCAINE HYDROCHLORIDE 20 MG/ML
INJECTION, SOLUTION EPIDURAL; INFILTRATION; INTRACAUDAL; PERINEURAL AS NEEDED
Status: DISCONTINUED | OUTPATIENT
Start: 2023-05-19 | End: 2023-05-19 | Stop reason: SURG

## 2023-05-19 RX ADMIN — PROPOFOL 125 MCG/KG/MIN: 10 INJECTION, EMULSION INTRAVENOUS at 08:19

## 2023-05-19 RX ADMIN — PROPOFOL 50 MG: 10 INJECTION, EMULSION INTRAVENOUS at 08:27

## 2023-05-19 RX ADMIN — PROPOFOL 100 MG: 10 INJECTION, EMULSION INTRAVENOUS at 08:19

## 2023-05-19 RX ADMIN — PROPOFOL 50 MG: 10 INJECTION, EMULSION INTRAVENOUS at 08:25

## 2023-05-19 RX ADMIN — PROPOFOL 100 MG: 10 INJECTION, EMULSION INTRAVENOUS at 08:31

## 2023-05-19 RX ADMIN — SODIUM CHLORIDE, POTASSIUM CHLORIDE, SODIUM LACTATE AND CALCIUM CHLORIDE: 600; 310; 30; 20 INJECTION, SOLUTION INTRAVENOUS at 07:25

## 2023-05-19 RX ADMIN — LIDOCAINE HYDROCHLORIDE 100 MG: 20 INJECTION, SOLUTION EPIDURAL; INFILTRATION; INTRACAUDAL; PERINEURAL at 08:19

## 2023-05-19 NOTE — ANESTHESIA PREPROCEDURE EVALUATION
Anesthesia Evaluation     Patient summary reviewed and Nursing notes reviewed   no history of anesthetic complications:  NPO Solid Status: > 8 hours  NPO Liquid Status: > 2 hours           Airway   Mallampati: II  TM distance: >3 FB  Neck ROM: full  No difficulty expected  Dental      Pulmonary - normal exam    breath sounds clear to auscultation  (+) a smoker Former,   Cardiovascular - normal exam  Exercise tolerance: good (4-7 METS)    Rhythm: regular  Rate: normal    (+) hyperlipidemia,       Neuro/Psych- negative ROS  GI/Hepatic/Renal/Endo - negative ROS     Musculoskeletal (-) negative ROS    Abdominal    Substance History - negative use     OB/GYN negative ob/gyn ROS         Other      history of cancer (Breast)    ROS/Med Hx Other: PAT Nursing Notes unavailable.                   Anesthesia Plan    ASA 2     general and MAC     (Patient understands anesthesia not responsible for dental damage.)  intravenous induction     Anesthetic plan, risks, benefits, and alternatives have been provided, discussed and informed consent has been obtained with: patient.    Use of blood products discussed with patient .   Plan discussed with CRNA.        CODE STATUS:

## 2023-05-19 NOTE — ANESTHESIA POSTPROCEDURE EVALUATION
Patient: Digna Baron    Procedure Summary     Date: 05/19/23 Room / Location: [facility] ENDOSCOPY 5 / [facility] ENDOSCOPY    Anesthesia Start: 0818 Anesthesia Stop: 0848    Procedure: COLONOSCOPY Diagnosis:       Screening for malignant neoplasm of colon      History of breast cancer      (Screening for malignant neoplasm of colon [Z12.11])      (History of breast cancer [Z85.3])    Surgeons: Tylor Bahena MD Provider: Citlaly Iqbal MD    Anesthesia Type: general, MAC ASA Status: 2          Anesthesia Type: general, MAC    Vitals  Vitals Value Taken Time   /61 05/19/23 0902   Temp 36.2 °C (97.2 °F) 05/19/23 0850   Pulse 71 05/19/23 0907   Resp 16 05/19/23 0850   SpO2 96 % 05/19/23 0907   Vitals shown include unvalidated device data.        Post Anesthesia Care and Evaluation    Patient location during evaluation: bedside  Patient participation: complete - patient participated  Level of consciousness: awake  Pain management: adequate    Airway patency: patent  PONV Status: none  Cardiovascular status: acceptable and stable  Respiratory status: acceptable  Hydration status: acceptable    Comments: An Anesthesiologist personally participated in the most demanding procedures (including induction and emergence if applicable) in the anesthesia plan, monitored the course of anesthesia administration at frequent intervals and remained physically present and available for immediate diagnosis and treatment of emergencies.

## 2023-05-19 NOTE — H&P
Colonoscopy consultation        History of Present Illness  Digna Baron is a 47 y.o. female presents to [facility] GENERAL SURGERY for colonoscopy consultation.     Patient presents today on referral from Bee Xavier for colonoscopy consultation.  Patient denies any abdominal pain, change in bowel habit, or rectal bleeding.     Denies any family history of colorectal cancer.     No previous colonoscopy.           Objective              Past Medical History:   Diagnosis Date   • Breast cancer (HCC)     • Cancer (HCC) 2018     Breast               Past Surgical History:   Procedure Laterality Date   • BREAST SURGERY   2018     Bilateral Mastectomy and implant exchange in May 2019   •  SECTION   1996     Daughter's Birth   • LYMPH NODE BIOPSY   2018     Breast cancer                Outpatient Medications Marked as Taking for the 23 encounter (Office Visit) with Tatiana Delaney APRN   Medication Sig Dispense Refill   • BIOTIN FORTE PO Take  by mouth.       • Ferrous Sulfate Dried (FERROUS SULFATE IRON PO) Take  by mouth.       • Multiple Vitamins-Minerals (ZINC PO) Take  by mouth.       • Omega-3 Fatty Acids (fish oil) 1000 MG capsule capsule Take  by mouth Daily With Breakfast.       • tamoxifen (NOLVADEX) 10 MG tablet Take 2 tablets by mouth Daily. 90 tablet 1   • vitamin C (ASCORBIC ACID) 250 MG tablet Take 250 mg by mouth Daily.       • Zinc Acetate, Oral, (ZINC ACETATE PO) Take  by mouth.             No Known Allergies            Family History   Problem Relation Age of Onset   • Drug abuse Father           When he was very young. He's been clean most of my life   • Cancer Maternal Grandfather           Bladder cancer   • Diabetes Maternal Grandfather           Extreme kidney failure. One at 0% function   • Heart disease Maternal Grandfather           Heart attack when he was 50   • Kidney disease Maternal Grandfather     • Cancer Paternal Grandmother  "          Breast cancer   • Cancer Paternal Aunt           Gall bladder cancer   • Early death Paternal Aunt           Passed from the cancer at 41 yo   • Thyroid disease Daughter           Hypothyroidism         Social History            Socioeconomic History   • Marital status:    Tobacco Use   • Smoking status: Former       Packs/day: 1.00       Years: 20.00       Pack years: 20.00       Types: Cigarettes       Start date: 5/1/1992       Quit date: 7/16/2012       Years since quitting: 10.5   • Smokeless tobacco: Never   Vaping Use   • Vaping Use: Never used   Substance and Sexual Activity   • Alcohol use: Not Currently       Comment: Heavy   • Drug use: Yes       Types: Hashish, Marijuana, Methamphetamines       Comment: 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   Gastrointestinal: Negative for abdominal distention, abdominal pain, anal bleeding, blood in stool, constipation, diarrhea and rectal pain.         Vital Signs:   Pulse 65   Resp 12   Ht 165.1 cm (65\")   Wt 76 kg (167 lb 9.6 oz)   SpO2 95%   BMI 27.89 kg/m²      Physical Exam  Vitals and nursing note reviewed.   Constitutional:       General: She is not in acute distress.     Appearance: Normal appearance.   HENT:      Head: Normocephalic.   Cardiovascular:      Rate and Rhythm: Normal rate.   Pulmonary:      Effort: Pulmonary effort is normal.      Breath sounds: No stridor.   Abdominal:      Palpations: Abdomen is soft.      Tenderness: There is no guarding.   Musculoskeletal:         General: No deformity. Normal range of motion.   Skin:     General: Skin is warm and dry.      Coloration: Skin is not jaundiced.   Neurological:      General: No focal deficit present.      Mental Status: She is alert and oriented to person, place, and time.   Psychiatric:         Mood and " Affect: Mood normal.         Thought Content: Thought content normal.                  Result Review    :            []?  Laboratory  []?  Radiology  [x]?  Pathology  []?  Microbiology  []?  EKG/Telemetry   []?  Cardiology/Vascular   [x]?  Old records  Today I reviewed Bee Xavier's previous office note.        Assessment      Assessment and Plan    Diagnoses and all orders for this visit:     1. Screening for malignant neoplasm of colon (Primary)     2. History of breast cancer     Other orders  -     Sodium Sulfate-Mag Sulfate-KCl (Sutab) 7543-109-782 MG tablet; Take 24 tablets by mouth 1 (One) Time for 1 dose. Take as directed. Directions given in office  Dispense: 24 tablet; Refill: 0           Follow Up   Return for Schedule colonoscopy with Dr. Bahena on 5/19/2023 at [facility].      Phone PAT.  Hospital call with arrival time.     Possible risks/complications, benefits, and alternatives to surgical or invasive procedure have been explained to patient and/or legal guardian.     Patient has been evaluated and can tolerate anesthesia and/or sedation. Risks, benefits, and alternatives to anesthesia and sedation have been explained to patient and/or legal guardian.  Patient verbalizes understanding and is willing to proceed with above plan.      Patient was given instructions and counseling regarding her condition or for health maintenance advice. Please see specific information pulled into the AVS if appropriate.      The office note was dictated with the help of the dragon dictation system.

## 2023-05-24 ENCOUNTER — TELEPHONE (OUTPATIENT)
Dept: ONCOLOGY | Facility: HOSPITAL | Age: 48
End: 2023-05-24
Payer: COMMERCIAL

## 2023-05-24 DIAGNOSIS — E66.3 OVERWEIGHT (BMI 25.0-29.9): ICD-10-CM

## 2023-05-24 DIAGNOSIS — Z85.3 HISTORY OF BREAST CANCER: ICD-10-CM

## 2023-05-24 RX ORDER — TAMOXIFEN CITRATE 10 MG/1
20 TABLET ORAL DAILY
Qty: 90 TABLET | Refills: 1 | Status: CANCELLED | OUTPATIENT
Start: 2023-05-24

## 2023-05-24 NOTE — TELEPHONE ENCOUNTER
Provider: DR BRODERICK  Caller: YOSVANY    Relationship to Patient: SELF    Pharmacy: [facility]  Phone Number: [phone]    Reason for Call: YOSVANY IS CALLING STATES SHE WAS IN ON 5-16 AND DR BRODERICK MENTIONED PUTTING HER ON AN ANTIDEPRESSANT.  YOSVANY CHECKED HER MY CHART AND SHE DID NOT SEE WHERE ONE WAS CALLED IN YET    PLEASE ADVISE    When was the patient last seen: 5-16

## 2023-05-25 RX ORDER — TOPIRAMATE 25 MG/1
TABLET ORAL
Qty: 60 TABLET | Refills: 2 | Status: SHIPPED | OUTPATIENT
Start: 2023-05-25

## 2023-05-26 RX ORDER — TAMOXIFEN CITRATE 10 MG/1
20 TABLET ORAL DAILY
Qty: 90 TABLET | Refills: 1 | Status: SHIPPED | OUTPATIENT
Start: 2023-05-26

## 2023-05-26 NOTE — TELEPHONE ENCOUNTER
PATIENT IS CALLING BACK REGARDING MESSAGE, SHE HAS NOT HEARD FROM ANYONE. PATIENT STATES SHE ALSO SENT IN A REQUEST FOR REFILL ON HER TAMOXIFEN THAT HAS NOT BEEN CALLED IN YET AND SHE IS GOING OUT OF TOWN   Detail Level: Detailed Detail Level: Simple

## 2023-06-13 ENCOUNTER — TELEPHONE (OUTPATIENT)
Dept: ONCOLOGY | Facility: HOSPITAL | Age: 48
End: 2023-06-13
Payer: COMMERCIAL

## 2023-06-13 NOTE — TELEPHONE ENCOUNTER
Patient called and requested to move her Ultrasound and follow up with Dr. Maciel to mid July as she will be out of town until then. Appts have been adjusted per request.

## 2023-07-19 ENCOUNTER — OFFICE VISIT (OUTPATIENT)
Dept: ONCOLOGY | Facility: HOSPITAL | Age: 48
End: 2023-07-19
Payer: COMMERCIAL

## 2023-07-19 VITALS
WEIGHT: 157.19 LBS | OXYGEN SATURATION: 98 % | SYSTOLIC BLOOD PRESSURE: 105 MMHG | BODY MASS INDEX: 26.19 KG/M2 | DIASTOLIC BLOOD PRESSURE: 62 MMHG | TEMPERATURE: 97.1 F | RESPIRATION RATE: 18 BRPM | HEIGHT: 65 IN | HEART RATE: 66 BPM

## 2023-07-19 DIAGNOSIS — C50.512 MALIGNANT NEOPLASM OF LOWER-OUTER QUADRANT OF LEFT FEMALE BREAST, UNSPECIFIED ESTROGEN RECEPTOR STATUS: Primary | ICD-10-CM

## 2023-07-19 PROCEDURE — G0463 HOSPITAL OUTPT CLINIC VISIT: HCPCS | Performed by: INTERNAL MEDICINE

## 2023-07-19 RX ORDER — SERTRALINE HYDROCHLORIDE 25 MG/1
75 TABLET, FILM COATED ORAL DAILY
Qty: 90 TABLET | Refills: 3 | Status: SHIPPED | OUTPATIENT
Start: 2023-07-19 | End: 2023-09-03 | Stop reason: SDUPTHER

## 2023-07-19 RX ORDER — SERTRALINE HYDROCHLORIDE 25 MG/1
75 TABLET, FILM COATED ORAL DAILY
Qty: 90 TABLET | Refills: 3 | Status: SHIPPED | OUTPATIENT
Start: 2023-07-19 | End: 2023-07-19 | Stop reason: SDUPTHER

## 2023-07-19 NOTE — PROGRESS NOTES
Patient  Digna Baron    Location  [facility] HEMATOLOGY & ONCOLOGY    Chief Complaint  FHx Breast Cancer    Referring Provider: POLA Nguyễn  PCP: Brenda Ying APRN    Subjective     {Problem List  Visit Diagnosis   Encounters  Notes  Medications  Labs  Result Review Imaging  Media :23}     Oncology/Hematology History Overview Note     ER+ Left Breast Cancer:    - diagnosed at age 44  - Bilateral skin sparing mastectomy and left SLNB on 12/21/18: invastive ductal carcinoma of the left breast, grade 2, 12mm focus, negative margins, closest was 4mm, not associated with DCIS, 1 of 2 LN with micrometastatic disease, pT1c pN1mi (stage IA), ER+, MT+, HER2 negative  - treatment with adjuvant chemotherapy with TC but no radiation.   - on Tamoxifen since 2019     Malignant neoplasm metastatic to lymph nodes   12/28/2018 Initial Diagnosis    Malignant neoplasm metastatic to lymph nodes (HCC)     Malignant neoplasm of lower-outer quadrant of left female breast   12/6/2018 Initial Diagnosis    Malignant neoplasm of lower-outer quadrant of left female breast (HCC)         History of Present Illness  ***    Review of Systems   Constitutional:  Negative for appetite change, diaphoresis, fatigue, fever, unexpected weight gain and unexpected weight loss.   HENT:  Negative for hearing loss, mouth sores, sore throat, swollen glands, trouble swallowing and voice change.    Eyes:  Negative for blurred vision.   Respiratory:  Negative for cough, shortness of breath and wheezing.    Cardiovascular:  Negative for chest pain and palpitations.   Gastrointestinal:  Negative for abdominal pain, blood in stool, constipation, diarrhea, nausea and vomiting.   Endocrine: Negative for cold intolerance and heat intolerance.   Genitourinary:  Negative for difficulty urinating, dysuria, frequency, hematuria and urinary incontinence.   Musculoskeletal:  Negative for arthralgias, back pain and myalgias.    Skin:  Negative for rash, skin lesions and wound.   Neurological:  Negative for dizziness, seizures, weakness, numbness and headache.   Hematological:  Does not bruise/bleed easily.   Psychiatric/Behavioral:  Negative for depressed mood. The patient is not nervous/anxious.    All other systems reviewed and are negative.    Past Medical History:   Diagnosis Date    Anemia     Slightly anemic throughout my life    Breast cancer     Cancer 2018    Breast    Substance abuse     Have not had any mind or mood altering drugs since  and am in recovery     Past Surgical History:   Procedure Laterality Date    BREAST SURGERY  2018    Bilateral Mastectomy and implant exchange in May 2019     SECTION  1996    Daughter's Birth    COLONOSCOPY N/A 2023    Procedure: COLONOSCOPY;  Surgeon: Tylor Bahena MD;  Location: [facility] ENDOSCOPY;  Service: General;  Laterality: N/A;  normal colonoscopy    LYMPH NODE BIOPSY  2018    Breast cancer     Social History     Socioeconomic History    Marital status:    Tobacco Use    Smoking status: Former     Packs/day: 1.00     Years: 20.00     Pack years: 20.00     Types: Cigarettes     Start date: 1992     Quit date: 2012     Years since quittin.0    Smokeless tobacco: Never   Vaping Use    Vaping Use: Never used   Substance and Sexual Activity    Alcohol use: Not Currently     Comment: Heavy    Drug use: Yes     Types: Hashish, Marijuana, Methamphetamines     Comment: 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 He's been clean most of my life    Cancer Maternal Grandfather         Bladder cancer    Diabetes Maternal Grandfather         Extreme kidney failure. One at 0% function    Heart disease Maternal Grandfather         Heart attack when  he was 50    Kidney disease Maternal Grandfather     Cancer Paternal Grandmother         Breast cancer    Cancer Paternal Aunt         Gall bladder cancer    Early death Paternal Aunt         Passed from the cancer at 39 yo    Thyroid disease Daughter         Hypothyroidism       Objective   Physical Exam  ***    There were no vitals filed for this visit.            PHQ-9 Total Score:         Result Review :{Labs  Result Review  Imaging  Med Tab  Media  Procedures :23}   The following data was reviewed by: Sowmya Bush MA on 07/19/2023:  Lab Results   Component Value Date    HGB 13.0 10/17/2022    HCT 39.7 10/17/2022    MCV 95.2 10/17/2022     10/17/2022    WBC 7.36 10/17/2022    NEUTROABS 4.70 10/17/2022    LYMPHSABS 2.03 10/17/2022    MONOSABS 0.40 10/17/2022    EOSABS 0.15 10/17/2022    BASOSABS 0.05 10/17/2022     Lab Results   Component Value Date    GLUCOSE 100 (H) 10/17/2022    BUN 13 10/17/2022    CREATININE 0.93 10/17/2022     10/17/2022    K 3.7 10/17/2022     10/17/2022    CO2 27.8 10/17/2022    CALCIUM 9.3 10/17/2022    PROTEINTOT 6.5 10/17/2022    ALBUMIN 4.10 10/17/2022    BILITOT <0.2 10/17/2022    ALKPHOS 65 10/17/2022    AST 30 10/17/2022    ALT 28 10/17/2022     No results found for: IRON, LABIRON, TRANSFERRIN, TIBC  No results found for: LDH, FERRITIN, [id number], FOLATE  No results found for: PSA, CEA, AFP, ,        Assessment and Plan {CC Problem List  Visit Diagnosis   ROS  Review (Popup)  Health Maintenance  Quality  BestPractice  Medications  SmartSets  SnapShot Encounters  Media :23}   There are no diagnoses linked to this encounter.      Patient was given instructions and counseling regarding her condition or for health maintenance advice. Please see specific information pulled into the AVS if appropriate.     Sowmya Bush MA    7/19/2023         10/17/2022    ALKPHOS 65 10/17/2022    AST 30 10/17/2022    ALT 28 10/17/2022     No results found for: IRON, LABIRON, TRANSFERRIN, TIBC  No results found for: LDH, FERRITIN, [id number], FOLATE  No results found for: PSA, CEA, AFP, ,        Assessment and Plan    Diagnoses and all orders for this visit:    1. Malignant neoplasm of lower-outer quadrant of left female breast, unspecified estrogen receptor status [C50.512 (ICD-10-CM)] (Primary)    Other orders  -     Discontinue: sertraline (ZOLOFT) 25 MG tablet; Take 3 tablets by mouth Daily.  Dispense: 90 tablet; Refill: 3  -     Discontinue: sertraline (ZOLOFT) 25 MG tablet; Take 3 tablets by mouth Daily.  Dispense: 90 tablet; Refill: 3          ER+ Left Breast Cancer:  S/p bilateral skin sparing mastectomy and adjuvant TC.  Now on tamoxifen.  Results from her recent left axillary ultrasound are still pending.  I will contact the patient when we know results of this.  I will plan follow-up with her in 1 month.    Depression symptoms: exacerbated by tamoxifen.  She remained on tamoxifen and started Zoloft.  I will increase the dose to 75 mg daily.  She will follow-up with her primary care provider for ongoing management.    Patient was given instructions and counseling regarding her condition or for health maintenance advice. Please see specific information pulled into the AVS if appropriate.     Serena Maciel MD PhD    9/12/2023

## 2023-07-19 NOTE — PROGRESS NOTES
Patient  Digna Baron    Location  [facility] HEMATOLOGY & ONCOLOGY    Chief Complaint  FHx Breast Cancer    Referring Provider: POLA Nguyễn  PCP: Brenda Ying APRN    Subjective     {Problem List  Visit Diagnosis   Encounters  Notes  Medications  Labs  Result Review Imaging  Media :23}     Oncology/Hematology History Overview Note     ER+ Left Breast Cancer:    - diagnosed at age 44  - Bilateral skin sparing mastectomy and left SLNB on 18: invastive ductal carcinoma of the left breast, grade 2, 12mm focus, negative margins, closest was 4mm, not associated with DCIS, 1 of 2 LN with micrometastatic disease, pT1c pN1mi (stage IA), ER+, AL+, HER2 negative  - treatment with adjuvant chemotherapy with TC but no radiation.   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 nodes   2018 Initial Diagnosis    Malignant neoplasm metastatic to lymph nodes (HCC)     Malignant neoplasm of lower-outer quadrant of left female breast   2018 Initial Diagnosis    Malignant neoplasm of lower-outer quadrant of left female breast (HCC)         History of Present Illness  ***    Review of Systems    Past Medical History:   Diagnosis Date    Anemia     Slightly anemic throughout my life    Breast cancer     Cancer 2018    Breast    Substance abuse     Have not had any mind or mood altering drugs since  and am in recovery     Past Surgical History:   Procedure Laterality Date    BREAST SURGERY  2018    Bilateral Mastectomy and implant exchange in May 2019     SECTION  1996    Daughter's Birth    COLONOSCOPY N/A 2023    Procedure: COLONOSCOPY;  Surgeon: Tylor Bahena MD;  Location: [facility] ENDOSCOPY;  Service: General;  Laterality: N/A;  normal colonoscopy    LYMPH NODE BIOPSY  2018    Breast cancer     Social History     Socioeconomic History    Marital status:    Tobacco Use    Smoking status: Former      Packs/day: 1.00     Years: 20.00     Pack years: 20.00     Types: Cigarettes     Start date: 1992     Quit date: 2012     Years since quittin.0    Smokeless tobacco: Never   Vaping Use    Vaping Use: Never used   Substance and Sexual Activity    Alcohol use: Not Currently     Comment: Heavy    Drug use: Yes     Types: Hashish, Marijuana, Methamphetamines     Comment: 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 He's been clean most of my life    Cancer Maternal Grandfather         Bladder cancer    Diabetes Maternal Grandfather         Extreme kidney failure. One at 0% function    Heart disease Maternal Grandfather         Heart attack when he was 50    Kidney disease Maternal Grandfather     Cancer Paternal Grandmother         Breast cancer    Cancer Paternal Aunt         Gall bladder cancer    Early death Paternal Aunt         Passed from the cancer at 39 yo    Thyroid disease Daughter         Hypothyroidism       Objective   Physical Exam  ***    There were no vitals filed for this visit.            PHQ-9 Total Score:         Result Review :{Labs  Result Review  Imaging  Med Tab  Media  Procedures :23}   The following data was reviewed by: Sowmya Bush MA on 2023:  Lab Results   Component Value Date    HGB 13.0 10/17/2022    HCT 39.7 10/17/2022    MCV 95.2 10/17/2022     10/17/2022    WBC 7.36 10/17/2022    NEUTROABS 4.70 10/17/2022    LYMPHSABS 2.03 10/17/2022    MONOSABS 0.40 10/17/2022    EOSABS 0.15 10/17/2022    BASOSABS 0.05 10/17/2022     Lab Results   Component Value Date    GLUCOSE 100 (H) 10/17/2022    BUN 13 10/17/2022    CREATININE 0.93 10/17/2022     10/17/2022    K 3.7 10/17/2022     10/17/2022    CO2 27.8 10/17/2022    CALCIUM 9.3 10/17/2022    PROTEINTOT 6.5  10/17/2022    ALBUMIN 4.10 10/17/2022    BILITOT <0.2 10/17/2022    ALKPHOS 65 10/17/2022    AST 30 10/17/2022    ALT 28 10/17/2022     No results found for: IRON, LABIRON, TRANSFERRIN, TIBC  No results found for: LDH, FERRITIN, [id number], FOLATE  No results found for: PSA, CEA, AFP, ,        Assessment and Plan {CC Problem List  Visit Diagnosis   ROS  Review (Popup)  Health Maintenance  Quality  BestPractice  Medications  SmartSets  SnapShot Encounters  Media :23}   There are no diagnoses linked to this encounter.      Patient was given instructions and counseling regarding her condition or for health maintenance advice. Please see specific information pulled into the AVS if appropriate.     Sowmya Bush MA    7/19/2023

## 2023-07-24 ENCOUNTER — HOSPITAL ENCOUNTER (OUTPATIENT)
Dept: GENERAL RADIOLOGY | Facility: HOSPITAL | Age: 48
Discharge: HOME | End: 2023-07-24
Payer: COMMERCIAL

## 2023-07-24 ENCOUNTER — LAB (OUTPATIENT)
Dept: LAB | Facility: HOSPITAL | Age: 48
End: 2023-07-24
Payer: COMMERCIAL

## 2023-07-24 ENCOUNTER — OFFICE VISIT (OUTPATIENT)
Dept: PULMONOLOGY | Facility: CLINIC | Age: 48
End: 2023-07-24
Payer: COMMERCIAL

## 2023-07-24 VITALS
WEIGHT: 158.6 LBS | SYSTOLIC BLOOD PRESSURE: 110 MMHG | OXYGEN SATURATION: 96 % | HEIGHT: 65 IN | HEART RATE: 70 BPM | RESPIRATION RATE: 16 BRPM | TEMPERATURE: 98.2 F | DIASTOLIC BLOOD PRESSURE: 78 MMHG | BODY MASS INDEX: 26.42 KG/M2

## 2023-07-24 DIAGNOSIS — R05.3 CHRONIC COUGH: ICD-10-CM

## 2023-07-24 DIAGNOSIS — R09.82 POSTNASAL DRIP: ICD-10-CM

## 2023-07-24 DIAGNOSIS — R05.3 CHRONIC COUGH: Primary | ICD-10-CM

## 2023-07-24 DIAGNOSIS — R91.1 LUNG NODULE: Primary | ICD-10-CM

## 2023-07-24 LAB
BASOPHILS # BLD AUTO: 0.07 10*3/MM3 (ref 0–0.2)
BASOPHILS NFR BLD AUTO: 1.2 % (ref 0–1.5)
DEPRECATED RDW RBC AUTO: 42.5 FL (ref 37–54)
EOSINOPHIL # BLD AUTO: 0.16 10*3/MM3 (ref 0–0.4)
EOSINOPHIL NFR BLD AUTO: 2.7 % (ref 0.3–6.2)
ERYTHROCYTE [DISTWIDTH] IN BLOOD BY AUTOMATED COUNT: 12.2 % (ref 12.3–15.4)
HCT VFR BLD AUTO: 39.2 % (ref 34–46.6)
HGB BLD-MCNC: 13 G/DL (ref 12–15.9)
IMM GRANULOCYTES # BLD AUTO: 0.03 10*3/MM3 (ref 0–0.05)
IMM GRANULOCYTES NFR BLD AUTO: 0.5 % (ref 0–0.5)
LYMPHOCYTES # BLD AUTO: 2.12 10*3/MM3 (ref 0.7–3.1)
LYMPHOCYTES NFR BLD AUTO: 36.2 % (ref 19.6–45.3)
MCH RBC QN AUTO: 31.3 PG (ref 26.6–33)
MCHC RBC AUTO-ENTMCNC: 33.2 G/DL (ref 31.5–35.7)
MCV RBC AUTO: 94.2 FL (ref 79–97)
MONOCYTES # BLD AUTO: 0.4 10*3/MM3 (ref 0.1–0.9)
MONOCYTES NFR BLD AUTO: 6.8 % (ref 5–12)
NEUTROPHILS NFR BLD AUTO: 3.07 10*3/MM3 (ref 1.7–7)
NEUTROPHILS NFR BLD AUTO: 52.6 % (ref 42.7–76)
NRBC BLD AUTO-RTO: 0 /100 WBC (ref 0–0.2)
PLATELET # BLD AUTO: 185 10*3/MM3 (ref 140–450)
PMV BLD AUTO: 10.9 FL (ref 6–12)
RBC # BLD AUTO: 4.16 10*6/MM3 (ref 3.77–5.28)
WBC NRBC COR # BLD: 5.85 10*3/MM3 (ref 3.4–10.8)

## 2023-07-24 PROCEDURE — 82785 ASSAY OF IGE: CPT

## 2023-07-24 PROCEDURE — 85025 COMPLETE CBC W/AUTO DIFF WBC: CPT

## 2023-07-24 PROCEDURE — 71046 X-RAY EXAM CHEST 2 VIEWS: CPT

## 2023-07-24 PROCEDURE — 36415 COLL VENOUS BLD VENIPUNCTURE: CPT

## 2023-07-24 PROCEDURE — 99203 OFFICE O/P NEW LOW 30 MIN: CPT | Performed by: INTERNAL MEDICINE

## 2023-07-24 RX ORDER — FLUTICASONE PROPIONATE 50 MCG
2 SPRAY, SUSPENSION (ML) NASAL DAILY
Qty: 1 G | Refills: 3 | Status: SHIPPED | OUTPATIENT
Start: 2023-07-24
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
## 2023-07-27 LAB — IGE SERPL-ACNC: 10.7 KU/L

## 2023-07-31 ENCOUNTER — PATIENT ROUNDING (BHMG ONLY) (OUTPATIENT)
Dept: PULMONOLOGY | Facility: CLINIC | Age: 48
End: 2023-07-31
Payer: COMMERCIAL

## 2023-08-03 DIAGNOSIS — R22.32 AXILLARY LUMP, LEFT: ICD-10-CM

## 2023-08-03 DIAGNOSIS — C50.512 MALIGNANT NEOPLASM OF LOWER-OUTER QUADRANT OF LEFT FEMALE BREAST, UNSPECIFIED ESTROGEN RECEPTOR STATUS: Primary | ICD-10-CM

## 2023-08-08 ENCOUNTER — HOSPITAL ENCOUNTER (OUTPATIENT)
Dept: CT IMAGING | Facility: HOSPITAL | Age: 48
Discharge: HOME | End: 2023-08-08
Admitting: INTERNAL MEDICINE
Payer: COMMERCIAL

## 2023-08-08 DIAGNOSIS — R91.1 LUNG NODULE: ICD-10-CM

## 2023-08-08 PROCEDURE — 25510000001 IOPAMIDOL PER 1 ML: Performed by: INTERNAL MEDICINE

## 2023-08-08 PROCEDURE — 71260 CT THORAX DX C+: CPT

## 2023-08-08 RX ADMIN — IOPAMIDOL 100 ML: 755 INJECTION, SOLUTION INTRAVENOUS at 13:58

## 2023-08-10 ENCOUNTER — HOSPITAL ENCOUNTER (OUTPATIENT)
Dept: MAMMOGRAPHY | Facility: HOSPITAL | Age: 48
Discharge: HOME | End: 2023-08-10
Payer: COMMERCIAL

## 2023-08-10 ENCOUNTER — PATIENT OUTREACH (OUTPATIENT)
Dept: ONCOLOGY | Facility: HOSPITAL | Age: 48
End: 2023-08-10
Payer: COMMERCIAL

## 2023-08-10 DIAGNOSIS — C50.512 MALIGNANT NEOPLASM OF LOWER-OUTER QUADRANT OF LEFT FEMALE BREAST, UNSPECIFIED ESTROGEN RECEPTOR STATUS: ICD-10-CM

## 2023-08-10 DIAGNOSIS — R22.32 AXILLARY LUMP, LEFT: ICD-10-CM

## 2023-08-10 PROCEDURE — 88305 TISSUE EXAM BY PATHOLOGIST: CPT | Performed by: INTERNAL MEDICINE

## 2023-08-10 PROCEDURE — 0 LIDOCAINE 1 % SOLUTION: Performed by: INTERNAL MEDICINE

## 2023-08-10 PROCEDURE — A4648 IMPLANTABLE TISSUE MARKER: HCPCS

## 2023-08-10 PROCEDURE — 88341 IMHCHEM/IMCYTCHM EA ADD ANTB: CPT | Performed by: INTERNAL MEDICINE

## 2023-08-10 PROCEDURE — 88342 IMHCHEM/IMCYTCHM 1ST ANTB: CPT | Performed by: INTERNAL MEDICINE

## 2023-08-10 PROCEDURE — 76942 ECHO GUIDE FOR BIOPSY: CPT

## 2023-08-10 RX ORDER — LIDOCAINE HYDROCHLORIDE AND EPINEPHRINE 10; 10 MG/ML; UG/ML
10 INJECTION, SOLUTION INFILTRATION; PERINEURAL ONCE
Status: COMPLETED | OUTPATIENT
Start: 2023-08-10 | End: 2023-08-10

## 2023-08-10 RX ORDER — LIDOCAINE HYDROCHLORIDE 10 MG/ML
10 INJECTION, SOLUTION INFILTRATION; PERINEURAL ONCE
Status: COMPLETED | OUTPATIENT
Start: 2023-08-10 | End: 2023-08-10

## 2023-08-10 RX ADMIN — LIDOCAINE HYDROCHLORIDE 10 ML: 10 INJECTION, SOLUTION INFILTRATION; PERINEURAL at 08:58

## 2023-08-10 RX ADMIN — LIDOCAINE HYDROCHLORIDE,EPINEPHRINE BITARTRATE 10 ML: 10; .01 INJECTION, SOLUTION INFILTRATION; PERINEURAL at 08:58

## 2023-08-11 LAB
CYTO UR: NORMAL
LAB AP CASE REPORT: NORMAL
LAB AP CLINICAL INFORMATION: NORMAL
LAB AP SPECIAL STAINS: NORMAL
Lab: NORMAL
PATH REPORT.FINAL DX SPEC: NORMAL
PATH REPORT.GROSS SPEC: NORMAL

## 2023-08-14 ENCOUNTER — PATIENT OUTREACH (OUTPATIENT)
Dept: ONCOLOGY | Facility: HOSPITAL | Age: 48
End: 2023-08-14
Payer: COMMERCIAL

## 2023-08-16 ENCOUNTER — PATIENT OUTREACH (OUTPATIENT)
Dept: ONCOLOGY | Facility: HOSPITAL | Age: 48
End: 2023-08-16
Payer: COMMERCIAL

## 2023-08-16 ENCOUNTER — OFFICE VISIT (OUTPATIENT)
Dept: FAMILY MEDICINE CLINIC | Facility: CLINIC | Age: 48
End: 2023-08-16
Payer: COMMERCIAL

## 2023-08-16 VITALS
DIASTOLIC BLOOD PRESSURE: 59 MMHG | BODY MASS INDEX: 26.49 KG/M2 | SYSTOLIC BLOOD PRESSURE: 117 MMHG | HEART RATE: 61 BPM | WEIGHT: 159 LBS | OXYGEN SATURATION: 99 % | HEIGHT: 65 IN

## 2023-08-16 DIAGNOSIS — F32.A ANXIETY AND DEPRESSION: ICD-10-CM

## 2023-08-16 DIAGNOSIS — F41.9 ANXIETY AND DEPRESSION: ICD-10-CM

## 2023-08-16 DIAGNOSIS — R49.0 HOARSENESS, CHRONIC: Primary | ICD-10-CM

## 2023-08-16 DIAGNOSIS — E66.3 OVERWEIGHT (BMI 25.0-29.9): ICD-10-CM

## 2023-08-16 RX ORDER — TOPIRAMATE 25 MG/1
TABLET ORAL
Qty: 60 TABLET | Refills: 5 | Status: SHIPPED | OUTPATIENT
Start: 2023-08-16

## 2023-08-16 NOTE — ASSESSMENT & PLAN NOTE
Down approximately 11 pounds since starting Topamax, patient will continue current dose and continue to work on healthy diet and exercise

## 2023-08-16 NOTE — ASSESSMENT & PLAN NOTE
We discussed that diarrhea can be a side effect of Zoloft, we discussed potentially stopping /switching the medication, for now patient desires to continue the medication she will monitor her symptoms, if there are any new or worsening symptoms, symptoms do not resolve, she will follow-up for further discussion

## 2023-08-18 ENCOUNTER — HOSPITAL ENCOUNTER (OUTPATIENT)
Dept: RESPIRATORY THERAPY | Facility: HOSPITAL | Age: 48
Discharge: HOME | End: 2023-08-18
Payer: COMMERCIAL

## 2023-08-18 ENCOUNTER — APPOINTMENT (OUTPATIENT)
Dept: CT IMAGING | Facility: HOSPITAL | Age: 48
End: 2023-08-18
Payer: COMMERCIAL

## 2023-08-18 DIAGNOSIS — R09.82 POSTNASAL DRIP: ICD-10-CM

## 2023-08-18 DIAGNOSIS — R05.3 CHRONIC COUGH: ICD-10-CM

## 2023-08-18 PROCEDURE — 94726 PLETHYSMOGRAPHY LUNG VOLUMES: CPT

## 2023-08-18 PROCEDURE — 94060 EVALUATION OF WHEEZING: CPT

## 2023-08-18 PROCEDURE — 94729 DIFFUSING CAPACITY: CPT

## 2023-08-18 RX ORDER — LEVALBUTEROL INHALATION SOLUTION 1.25 MG/3ML
1.25 SOLUTION RESPIRATORY (INHALATION) ONCE
Status: COMPLETED | OUTPATIENT
Start: 2023-08-18 | End: 2023-08-18

## 2023-08-18 RX ADMIN — LEVALBUTEROL HYDROCHLORIDE 1.25 MG: 1.25 SOLUTION RESPIRATORY (INHALATION) at 15:53

## 2023-08-20 DIAGNOSIS — Z85.3 HISTORY OF BREAST CANCER: ICD-10-CM

## 2023-08-21 RX ORDER — TAMOXIFEN CITRATE 10 MG/1
20 TABLET ORAL DAILY
Qty: 90 TABLET | Refills: 1 | Status: SHIPPED | OUTPATIENT
Start: 2023-08-21

## 2023-09-05 ENCOUNTER — OFFICE VISIT (OUTPATIENT)
Dept: ONCOLOGY | Facility: HOSPITAL | Age: 48
End: 2023-09-05
Payer: COMMERCIAL

## 2023-09-05 VITALS
RESPIRATION RATE: 18 BRPM | BODY MASS INDEX: 26.92 KG/M2 | HEART RATE: 65 BPM | OXYGEN SATURATION: 99 % | WEIGHT: 161.6 LBS | DIASTOLIC BLOOD PRESSURE: 76 MMHG | SYSTOLIC BLOOD PRESSURE: 119 MMHG | HEIGHT: 65 IN | TEMPERATURE: 97.7 F

## 2023-09-05 DIAGNOSIS — R22.32 AXILLARY LUMP, LEFT: ICD-10-CM

## 2023-09-05 DIAGNOSIS — C50.512 MALIGNANT NEOPLASM OF LOWER-OUTER QUADRANT OF LEFT FEMALE BREAST, UNSPECIFIED ESTROGEN RECEPTOR STATUS: Primary | ICD-10-CM

## 2023-09-05 PROCEDURE — G0463 HOSPITAL OUTPT CLINIC VISIT: HCPCS | Performed by: INTERNAL MEDICINE

## 2023-09-05 RX ORDER — SERTRALINE HYDROCHLORIDE 25 MG/1
75 TABLET, FILM COATED ORAL DAILY
Qty: 90 TABLET | Refills: 1 | Status: SHIPPED | OUTPATIENT
Start: 2023-09-05

## 2023-09-05 NOTE — PROGRESS NOTES
Patient  Digna Baron    Location  [facility]EMAT[facility]Y [facility]    Chief Complaint  Malignant neoplasm of lower-outer quadrant of left breast o    Referring Provider: POLA Nguyễn  PCP: Brenda Ying APRN    Subjective          Oncology/Hematology History Overview Note     ER+ Left Breast Cancer:    - diagnosed at age 44  - Bilateral skin sparing mastectomy and left SLNB on 12/21/18: invastive ductal carcinoma of the left breast, grade 2, 12mm focus, negative margins, closest was 4mm, not associated with DCIS, 1 of 2 LN with micrometastatic disease, pT1c pN1mi (stage IA), ER+, WV+, HER2 negative  - treatment with adjuvant chemotherapy with TC but no radiation.   - on Tamoxifen since 2019     Malignant neoplasm metastatic to lymph nodes   12/28/2018 Initial Diagnosis    Malignant neoplasm metastatic to lymph nodes (HCC)     Malignant neoplasm of lower-outer quadrant of left female breast   12/6/2018 Initial Diagnosis    Malignant neoplasm of lower-outer quadrant of left female breast (HCC)         History of Present Illness  Patient comes in for follow-up after biopsy of a lymph node on 8/10/2023.  Fortunately this was negative and showed only reactive tissue.  She believes it could be due to a viral illness she had after a trip to [place].  She has still not fully recovered her voice.    The patient tristin on tamoxifen and Zoloft for depression symptoms.  She is still tolerating this well.    Review of Systems   Constitutional:  Negative for appetite change, diaphoresis, fatigue, fever, unexpected weight gain and unexpected weight loss.   HENT:  Negative for hearing loss, mouth sores, sore throat, swollen glands, trouble swallowing and voice change.    Eyes:  Negative for blurred vision.   Respiratory:  Negative for cough, shortness of breath and wheezing.    Cardiovascular:  Negative for chest pain and palpitations.   Gastrointestinal:  Negative for abdominal pain, blood  Reason For Visit  ZENA MORAN is an established patient here today for a chief complaint of back pain and a follow-up management of medication refill.   :  services not used.   A chaperone is not applicable. She is unaccompanied.        Quality    Adult Wellness CI height documented, discussion of regular exercise, exercising regularly, no printed information given for activities, discussion of nutritional quality of diet, no patient education given about proper diet, alcohol use, not having considered quitting drinking, not getting angry when talked to about drinking, not having a drink or two in the morning to get going, not drinking alcohol regularly, and feeling guilty about it, no tobacco use, did not provide intervention and counseling in regards to tobacco use, does not have feelings of hopelessness (PHQ-2), no Anhedonia (PHQ-2), not referred to local mental health center, not taking medication for depression, no monitoring patient and no preventive medicine therapy for influenza.      History of Present Illness  Is here for refill on Lorazepam. She states that she needs lorazepam to help her anxiety. She stated that she stopped her Wellbutrin on her own. She stated that she noticed too many side effects. She does not want take that medication. She is stating that she tried in the past to decrease lorazepam but she had too much anxiety. She is very reluctant to decrease her Lorazepam and to follow the recommendations that I gave her. I did review her [place] . Last December she did go to another doctor. We discussed that we can be her only doctor. We did tell the patient that she needs to wean off the medication. She states she is taking 1.5 tablets a day currently. She has anxiety frequently. And she takes the medication for sleeping as well. She denies any suicidal ideation. She does have depression and fatigue.      Review of Systems    All other systems reviewed and negative.       Allergies  No Known Drug Allergies    Active Problems  Benign essential hypertension (I10)  Breast lump (N63.0)  Depression (F32.9)  Dysmenorrhea (N94.6)  Encounter for general health examination (Z00.00)  Fatigue (R53.83)  Fibroadenoma of left breast (D24.2)  Generalized anxiety disorder (F41.1)  Hyperactivity of bladder (N31.8)  Overactive bladder (N32.81)   · BLURRY VISION --with oxybutyrin/// other meds not covered on insurance  Pap smear for cervical cancer screening (Z12.4)  Postmenopausal (Z78.0)  Restless legs syndrome (G25.81)  Sprain of low back, initial encounter (S33.5XXA)  Sprain of sacroiliac joint (S33.6XXA)  Visit for screening mammogram (Z12.31)    Past Medical History  History of Abdominal pain (R10.9)  History of Acute bronchitis (J20.9)  History of Acute sinusitis (J01.90)  History of Backache (M54.9)  History of Elevated blood pressure reading without diagnosis of hypertension (R03.0)  History of Eustachian tube disorder (H69.90)  History of hypertension (Z86.79)  History of Sore throat (J02.9)    Surgical History  Denied: History of Appendectomy  History of Complete Colonoscopy   · 2011    Family History  Family History   Family history of Carcinoma Of The Large Intestine   · (father, diagnosed age 58)    Social History  Being A Social Drinker  Denied: Drug Use  Never smoker  Denied: Tobacco Use    Vitals  Signs   Recorded: [id number] 11:11AM   Weight: 155 lb   BMI Calculated: 25.02  BSA Calculated: 1.79  Recorded: [id number] 11:08AM   Height: 5 ft 6 in  Weight: 144 lb   BMI Calculated: 23.24  BSA Calculated: 1.74  Systolic: 114, Sitting  Diastolic: 84, Sitting  Temperature: 98 F, Tympanic  Heart Rate: 78  Respiration: 18  O2 Saturation: 97    Physical Exam  Constitutional: alert, in no acute distress and current vital signs reviewed.   Head and Face: atraumatic, no deformities, normocephalic, normal facies.   Eyes: no discharge, normal conjunctiva, no eyelid swelling, no ptosis and the sclerae  in stool, constipation, diarrhea, nausea and vomiting.   Endocrine: Negative for cold intolerance and heat intolerance.   Genitourinary:  Negative for difficulty urinating, dysuria, frequency, hematuria and urinary incontinence.   Musculoskeletal:  Negative for arthralgias, back pain and myalgias.   Skin:  Negative for rash, skin lesions and wound.   Neurological:  Negative for dizziness, seizures, weakness, numbness and headache.   Hematological:  Does not bruise/bleed easily.   Psychiatric/Behavioral:  Negative for depressed mood. The patient is not nervous/anxious.    All other systems reviewed and are negative.    Past Medical History:   Diagnosis Date    Anemia     Slightly anemic throughout my life    Breast cancer     Cancer 2018    Breast    Substance abuse     Have not had any mind or mood altering drugs since  and am in recovery     Past Surgical History:   Procedure Laterality Date    BREAST SURGERY  2018    Bilateral Mastectomy and implant exchange in May 2019     SECTION  1996    Daughter's Birth    COLONOSCOPY N/A 2023    Procedure: COLONOSCOPY;  Surgeon: Tylor Bahena MD;  Location: [facility] ENDOSCOPY;  Service: General;  Laterality: N/A;  normal colonoscopy    LYMPH NODE BIOPSY  2018    Breast cancer    US GUIDED LYMPH NODE BIOPSY  8/10/2023     Social History     Socioeconomic History    Marital status:    Tobacco Use    Smoking status: Former     Packs/day: 1.00     Years: 20.00     Pack years: 20.00     Types: Cigarettes     Start date: 1992     Quit date: 2012     Years since quittin.1    Smokeless tobacco: Never   Vaping Use    Vaping Use: Never used   Substance and Sexual Activity    Alcohol use: Not Currently     Comment: Heavy    Drug use: Yes     Types: Hashish, Marijuana, Methamphetamines     Comment: Clean from all drugs and alcohol since 10/2010    Sexual activity: Yes     Partners: Male     Birth control/protection:  "Vasectomy     Family History   Problem Relation Age of Onset    Drug abuse Father         When he was very young. He's been clean most of my life    Cancer Maternal Grandfather         Bladder cancer    Diabetes Maternal Grandfather         Extreme kidney failure. One at 0% function    Heart disease Maternal Grandfather         Heart attack when he was 50    Kidney disease Maternal Grandfather     Cancer Paternal Grandmother         Breast cancer    Cancer Paternal Aunt         Gall bladder cancer    Early death Paternal Aunt         Passed from the cancer at 41 yo    Thyroid disease Daughter         Hypothyroidism       Objective   Physical Exam  General: Alert, cooperative, no acute distress  Eyes: Anicteric sclera, PERRLA  Respiratory: normal respiratory effort  Cardiovascular: no lower extremity edema  Skin: Normal tone, no rash, no lesions  Psychiatric: Appropriate affect, intact judgment  Neurologic: No focal sensory or motor deficits, normal cognition   Musculoskeletal: Normal muscle strength and tone  Extremities: No clubbing, cyanosis, or deformities    Vitals:    09/05/23 1422   BP: 119/76   Pulse: 65   Resp: 18   Temp: 97.7 °F (36.5 °C)   SpO2: 99%   Weight: 73.3 kg (161 lb 9.6 oz)   Height: 165.1 cm (65\")   PainSc: 0-No pain     ECOG score: 0         PHQ-9 Total Score:         Result Review :   The following data was reviewed by: Serena Maciel MD PhD on 09/05/2023:  Lab Results   Component Value Date    HGB 13.0 07/24/2023    HCT 39.2 07/24/2023    MCV 94.2 07/24/2023     07/24/2023    WBC 5.85 07/24/2023    NEUTROABS 3.07 07/24/2023    LYMPHSABS 2.12 07/24/2023    MONOSABS 0.40 07/24/2023    EOSABS 0.16 07/24/2023    BASOSABS 0.07 07/24/2023     Lab Results   Component Value Date    GLUCOSE 100 (H) 10/17/2022    BUN 13 10/17/2022    CREATININE 0.93 10/17/2022     10/17/2022    K 3.7 10/17/2022     10/17/2022    CO2 27.8 10/17/2022    CALCIUM 9.3 10/17/2022    PROTEINTOT 6.5 " were normal. pupils equal, round and reactive to light and accommodation, conjugate gaze and extraocular movements were intact.   ENT: normal appearing outer ear, normal appearing nose. examination of the tympanic membrane showed normal landmarks, normal appearing external canal. nasal mucosa moist and pink, no nasal discharge. normal lips. oral mucosa pink and moist, no oral lesions.   Neck: normal appearing neck, supple neck and no mass was seen. thyroid not enlarged and no thyroid nodules.   Lymphatic: no lymphadenopathy.   Pulmonary: no respiratory distress, normal respiratory rate and effort and no accessory muscle use. breath sounds clear to auscultation bilaterally.   Cardiovascular: normal rate, no murmurs were heard, regular rhythm, normal S1 and normal S2.   Musculoskeletal: normal gait. no musculoskeletal erythema was seen, no joint swelling seen and no joint tenderness was elicited. no scoliosis. normal range of motion. there was no joint instability noted. muscle strength and tone were normal.      Immunizations  Influenza --- Series1: refused   Tdap --- Series1: REFUSED     Assessment  Benzodiazepine dependence (F13.20)   · Assessed By: ALEJANDRO STAFFORD (Primary Care); Last Assessed: 25 Aug 2018  Benign essential hypertension (I10)   · Assessed By: ALEJANDRO STAFFORD (Primary Care); Last Assessed: 25 Aug 2018  Depression (F32.9)   · Assessed By: ALEJANDRO STAFFORD (Primary Care); Last Assessed: 25 Aug 2018    A/P #1 depression advised to take Lexapro.  She seems reluctant to get off of her Lorazepam.  We had a long discussion regarding the weaning schedule I told her take 0.5 a day lorazepam every day and then gradually wean her off the following month.  I also told her to see an addiction medicine specialist.  She believes that she will have issues going off of this medication I told her addiction medicine told her that phone numbers to call for addiction medicine.  Patient seems very reluctant to do so ordered a  10/17/2022    ALBUMIN 4.10 10/17/2022    BILITOT <0.2 10/17/2022    ALKPHOS 65 10/17/2022    AST 30 10/17/2022    ALT 28 10/17/2022     No results found for: IRON, LABIRON, TRANSFERRIN, TIBC  No results found for: LDH, FERRITIN, [id number], FOLATE  No results found for: PSA, CEA, AFP, ,        Assessment and Plan    Diagnoses and all orders for this visit:    1. Malignant neoplasm of lower-outer quadrant of left female breast, unspecified estrogen receptor status [C50.512 (ICD-10-CM)] (Primary)        ER+ Left Breast Cancer:  S/p bilateral skin sparing mastectomy and adjuvant TC.  Depression symptoms were exacerbated by tamoxifen.  She is doing much better on Zoloft with Tamoxifen.  Axillary lymph node biopsy was negative.  I will schedule repeat left axillary ultrasound in 6 months as recommended.  She will follow-up with Dr. Ervin.        Patient was given instructions and counseling regarding her condition or for health maintenance advice. Please see specific information pulled into the AVS if appropriate.     Serena Maciel MD PhD    9/5/2023         drug screen on patient follow-up in 1 month a     Plan  Escitalopram Oxalate 10 MG Oral Tablet; TAKE 1 TABLET BY MOUTH EVERY DAY  LORazepam 0.5 MG Oral Tablet; TAKE 1 TABLET DAILY  DRUG SCREEN, URINE BASIC; [Do Not Release]; Status:Active; Requested  for:[id number];   Drug Rehabilitation & Detoxification Referral Treatment and Evaluation  For:  Benzodiazepine dependence  Status: Active  Requested for: [id number]  Care Summary provided. : Yes    Signatures   Electronically signed by : ALEJANDRO STAFFORD M.D.; Aug 25 2018 12:15PM CST

## 2023-09-18 ENCOUNTER — OFFICE VISIT (OUTPATIENT)
Dept: PULMONOLOGY | Facility: CLINIC | Age: 48
End: 2023-09-18
Payer: COMMERCIAL

## 2023-09-18 VITALS
TEMPERATURE: 98 F | DIASTOLIC BLOOD PRESSURE: 74 MMHG | HEIGHT: 65 IN | RESPIRATION RATE: 16 BRPM | BODY MASS INDEX: 26.92 KG/M2 | SYSTOLIC BLOOD PRESSURE: 119 MMHG | OXYGEN SATURATION: 97 % | HEART RATE: 62 BPM | WEIGHT: 161.6 LBS

## 2023-09-18 DIAGNOSIS — R09.82 POSTNASAL DRIP: ICD-10-CM

## 2023-09-18 DIAGNOSIS — R05.3 CHRONIC COUGH: Primary | ICD-10-CM

## 2023-09-18 PROCEDURE — 99213 OFFICE O/P EST LOW 20 MIN: CPT | Performed by: INTERNAL MEDICINE
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
## 2023-09-19 ENCOUNTER — OFFICE VISIT (OUTPATIENT)
Dept: FAMILY MEDICINE CLINIC | Facility: CLINIC | Age: 48
End: 2023-09-19
Payer: COMMERCIAL

## 2023-09-19 VITALS
SYSTOLIC BLOOD PRESSURE: 105 MMHG | DIASTOLIC BLOOD PRESSURE: 62 MMHG | WEIGHT: 161 LBS | HEIGHT: 65 IN | HEART RATE: 69 BPM | BODY MASS INDEX: 26.82 KG/M2 | OXYGEN SATURATION: 98 %

## 2023-09-19 DIAGNOSIS — R13.10 DYSPHAGIA, UNSPECIFIED TYPE: ICD-10-CM

## 2023-09-19 DIAGNOSIS — Z13.220 LIPID SCREENING: ICD-10-CM

## 2023-09-19 DIAGNOSIS — R49.0 HOARSENESS, CHRONIC: ICD-10-CM

## 2023-09-19 DIAGNOSIS — R53.83 OTHER FATIGUE: ICD-10-CM

## 2023-09-19 DIAGNOSIS — Z00.00 ANNUAL PHYSICAL EXAM: Primary | ICD-10-CM

## 2023-09-19 RX ORDER — LANOLIN ALCOHOL/MO/W.PET/CERES
1000 CREAM (GRAM) TOPICAL DAILY
COMMUNITY

## 2023-09-19 NOTE — PROGRESS NOTES
Chief Complaint  Thyroid Problem, annual exam    SUBJECTIVE  Digna Baron presents to [facility]     Pt here today for annual exam and requesting lab work to check for thyroid abnormalities. Pt states she is constantly clearing her throat and a voice change, it is more raspy now.  Patient was seen for this a month ago, referral to ENT made, states she has an appointment in March,    Pt states he also sometimes feels like she has difficulty swallowing, usually her spit, usually not food. States this has been going on for a couple months, states that she did not really think about mentioning it at previous office visit because it was not occurring that much.  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 He's been clean most of my life    Cancer Maternal Grandfather         Bladder cancer    Diabetes Maternal Grandfather         Extreme kidney failure. One at 0% function    Heart disease Maternal Grandfather         Heart attack when he was 50    Kidney disease Maternal Grandfather     Cancer Paternal Grandmother         Breast cancer    Cancer Paternal Aunt         Gall bladder cancer    Early death Paternal Aunt         Passed from the cancer at 41 yo    Thyroid disease Daughter         Hypothyroidism      Past Surgical History:   Procedure Laterality Date    BREAST SURGERY  2018    Bilateral Mastectomy and implant exchange in May 2019     SECTION  1996    Daughter's Birth    COLONOSCOPY N/A 2023    Procedure:  "COLONOSCOPY;  Surgeon: Tylor Bahena MD;  Location: [facility] ENDOSCOPY;  Service: General;  Laterality: N/A;  normal colonoscopy    LYMPH NODE BIOPSY  12/21/2018    Breast cancer    US GUIDED LYMPH NODE BIOPSY  8/10/2023        Current Outpatient Medications:     BIOTIN FORTE PO, Take  by mouth., Disp: , Rfl:     Ferrous Sulfate Dried (FERROUS SULFATE IRON PO), Take  by mouth., Disp: , Rfl:     fluticasone (FLONASE) 50 MCG/ACT nasal spray, 2 sprays into the nostril(s) as directed by provider Daily., Disp: 1 g, Rfl: 3    Omega-3 Fatty Acids (fish oil) 1000 MG capsule capsule, Take  by mouth Daily With Breakfast., Disp: , Rfl:     sertraline (ZOLOFT) 25 MG tablet, Take 3 tablets by mouth Daily., Disp: 90 tablet, Rfl: 1    tamoxifen (NOLVADEX) 10 MG tablet, Take 2 tablets by mouth Daily., Disp: 90 tablet, Rfl: 1    topiramate (Topamax) 25 MG tablet, 1 tab PO BID, Disp: 60 tablet, Rfl: 5    vitamin C (ASCORBIC ACID) 250 MG tablet, Take 1 tablet by mouth Daily., Disp: , Rfl:     Zinc Acetate, Oral, (ZINC ACETATE PO), Take  by mouth., Disp: , Rfl:     Multiple Vitamins-Minerals (ZINC PO), Take  by mouth. (Patient not taking: Reported on 9/19/2023), Disp: , Rfl:     vitamin B-12 (CYANOCOBALAMIN) 1000 MCG tablet, Take 1 tablet by mouth Daily., Disp: , Rfl:     OBJECTIVE  Vital Signs:   /62   Pulse 69   Ht 165.1 cm (65\")   Wt 73 kg (161 lb)   SpO2 98%   BMI 26.79 kg/m²    Estimated body mass index is 26.79 kg/m² as calculated from the following:    Height as of this encounter: 165.1 cm (65\").    Weight as of this encounter: 73 kg (161 lb).     Wt Readings from Last 3 Encounters:   09/19/23 73 kg (161 lb)   09/18/23 73.3 kg (161 lb 9.6 oz)   09/05/23 73.3 kg (161 lb 9.6 oz)     BP Readings from Last 3 Encounters:   09/19/23 105/62   09/18/23 119/74   09/05/23 119/76       Physical Exam  Vitals reviewed.   Constitutional:       General: She is not in acute distress.     Appearance: Normal appearance. She is " well-developed. She is not diaphoretic.   HENT:      Head: Normocephalic and atraumatic. Hair is normal.      Right Ear: Hearing, tympanic membrane, ear canal and external ear normal. No decreased hearing noted. No drainage.      Left Ear: Hearing, tympanic membrane, ear canal and external ear normal. No decreased hearing noted.      Nose: Nose normal. No nasal deformity.      Mouth/Throat:      Mouth: Mucous membranes are moist.   Eyes:      General: Lids are normal.         Right eye: No discharge.         Left eye: No discharge.      Extraocular Movements: Extraocular movements intact.      Conjunctiva/sclera: Conjunctivae normal.      Pupils: Pupils are equal, round, and reactive to light.   Neck:      Thyroid: No thyromegaly.      Vascular: No JVD.   Cardiovascular:      Rate and Rhythm: Normal rate and regular rhythm.      Pulses: Normal pulses.      Heart sounds: Normal heart sounds. No murmur heard.    No friction rub. No gallop.   Pulmonary:      Effort: Pulmonary effort is normal. No respiratory distress.      Breath sounds: Normal breath sounds. No wheezing or rales.   Chest:      Chest wall: No tenderness.   Abdominal:      General: Bowel sounds are normal. There is no distension.      Palpations: Abdomen is soft. There is no mass.      Tenderness: There is no abdominal tenderness. There is no guarding or rebound.      Hernia: No hernia is present.   Musculoskeletal:         General: No tenderness or deformity. Normal range of motion.      Cervical back: Normal range of motion and neck supple.   Lymphadenopathy:      Cervical: No cervical adenopathy.   Skin:     General: Skin is warm and dry.      Findings: No erythema or rash.   Neurological:      Mental Status: She is alert and oriented to person, place, and time.      Cranial Nerves: No cranial nerve deficit.      Motor: No abnormal muscle tone.      Coordination: Coordination normal.      Deep Tendon Reflexes: Reflexes are normal and symmetric.  Reflexes normal.   Psychiatric:         Mood and Affect: Mood normal.         Behavior: Behavior normal.         Thought Content: Thought content normal.         Judgment: Judgment normal.        Result Review    CMP          10/17/2022    13:13   CMP   Glucose 100    BUN 13    Creatinine 0.93    EGFR 76.4    Sodium 140    Potassium 3.7    Chloride 105    Calcium 9.3    Total Protein 6.5    Albumin 4.10    Globulin 2.4    Total Bilirubin <0.2    Alkaline Phosphatase 65    AST (SGOT) 30    ALT (SGPT) 28    Albumin/Globulin Ratio 1.7    BUN/Creatinine Ratio 14.0    Anion Gap 7.2      CBC          10/17/2022    13:13 7/24/2023    13:47   CBC   WBC 7.36  5.85    RBC 4.17  4.16    Hemoglobin 13.0  13.0    Hematocrit 39.7  39.2    MCV 95.2  94.2    MCH 31.2  31.3    MCHC 32.7  33.2    RDW 12.5  12.2    Platelets 159  185        XR Chest 2 View    Result Date: 7/24/2023    1.2 cm nodular opacity at the left lung base.  Consider CT scan for further evaluation.      DANIELLE TANNER MD       Electronically Signed and Approved By: DANIELLE TANNER MD on 7/24/2023 at 14:37             XR Forearm 2 View Left    Result Date: 6/14/2023  No acute process. Authenticated and Electronically Signed by Anjel Anne III, MD on 06/14/2023 07:58:13 AM    CT Chest With Contrast Diagnostic    Result Date: 8/9/2023   No suspicious pulmonary nodule demonstrated     IMANI SHEETS MD       Electronically Signed and Approved By: IMANI SHEETS MD on 8/09/2023 at 11:35             Mammo Post Device Placement Left    Result Date: 8/10/2023    Diagnostic left mammogram demonstrating localization clip in satisfactory position.     DANIELLE TANNER MD       Electronically Signed and Approved By: DANIELLE TANNER MD on 8/10/2023 at 9:38             US Guided Lymph Node Biopsy    Addendum Date: 8/14/2023    ADDENDUM:  COMPARISON: Other, MR, MRI BREAST BILATERAL W WO CONTRAST, 12/05/2018, 14:39.  Final surgical pathology report describes benign reactive  lymph node.  Imaging findings of pathology findings are concordant.  Consider follow-up targeted ultrasound in 6 months.    DANIELLE TANNER MD       Electronically Signed and Approved By: DANIELLE TANNER MD on 8/14/2023 at 18:05             Result Date: 8/14/2023   Uneventful ultrasound-guided core needle biopsy, left axillary lymph node with clip placement, as above.      DANIELLE TANNER MD       Electronically Signed and Approved By: DANIELLE TANNER MD on 8/10/2023 at 9:35             US Axilla Left    Result Date: 7/26/2023  2 left axillary lymph nodes with areas of mild focal cortical thickening measuring up to 5 mm.  The patient had a chest radiograph on 7/24/2023 recommending a follow-up CT scan of the chest for a 1.2 cm nodular opacity in the left lung base.  Suggest ultrasound-guided biopsy of the left axillary nodes if they are asymmetrically enlarged compared to the right axillary nodes on the CT scan of the chest.   MACKENZIE FRENCH MD       Electronically Signed and Approved By: MACKENZIE FRENCH MD on 7/26/2023 at 15:32                The above data has been reviewed by POLA Campbell 09/19/2023 14:30 EDT.          Patient Care Team:  Brenda Ying APRN as PCP - General (Nurse Practitioner)  Imani Lopez, COLETTE as Nurse Navigator            ASSESSMENT & PLAN    Diagnoses and all orders for this visit:    1. Annual physical exam (Primary)  -     Comprehensive Metabolic Panel; Future  -     CBC & Differential; Future  -     Lipid Panel; Future  -     TSH Rfx On Abnormal To Free T4; Future  -     T4, free; Future    2. Hoarseness, chronic  -     US Thyroid; Future    3. Dysphagia, unspecified type  -     US Thyroid; Future  -     Ambulatory Referral to Gastroenterology    4. Other fatigue  -     TSH Rfx On Abnormal To Free T4; Future  -     T4, free; Future  -     US Thyroid; Future    5. Lipid screening  -     Lipid Panel; Future         Tobacco Use: Medium Risk    Smoking Tobacco Use: Former     Smokeless Tobacco Use: Never    Passive Exposure: Not on file       The patient is advised to follow healthy diet and exercise, attempt to lose weight, continue current medications, and return for routine annual checkups.      Follow Up     Return if symptoms worsen or fail to improve.        Patient was given instructions and counseling regarding her condition or for health maintenance advice. Please see specific information pulled into the AVS if appropriate.   I have reviewed information obtained and documented by others and I have confirmed the accuracy of this documented note.    Brenda Ying APRN

## 2023-10-02 ENCOUNTER — LAB (OUTPATIENT)
Dept: LAB | Facility: HOSPITAL | Age: 48
End: 2023-10-02
Payer: COMMERCIAL

## 2023-10-02 DIAGNOSIS — Z13.220 LIPID SCREENING: ICD-10-CM

## 2023-10-02 DIAGNOSIS — Z00.00 ANNUAL PHYSICAL EXAM: ICD-10-CM

## 2023-10-02 DIAGNOSIS — R53.83 OTHER FATIGUE: ICD-10-CM

## 2023-10-02 LAB
ALBUMIN SERPL-MCNC: 4 G/DL (ref 3.5–5.2)
ALBUMIN/GLOB SERPL: 1.7 G/DL
ALP SERPL-CCNC: 65 U/L (ref 39–117)
ALT SERPL W P-5'-P-CCNC: 24 U/L (ref 1–33)
ANION GAP SERPL CALCULATED.3IONS-SCNC: 8 MMOL/L (ref 5–15)
AST SERPL-CCNC: 31 U/L (ref 1–32)
BASOPHILS # BLD AUTO: 0.05 10*3/MM3 (ref 0–0.2)
BASOPHILS NFR BLD AUTO: 0.8 % (ref 0–1.5)
BILIRUB SERPL-MCNC: 0.2 MG/DL (ref 0–1.2)
BUN SERPL-MCNC: 11 MG/DL (ref 6–20)
BUN/CREAT SERPL: 11.8 (ref 7–25)
CALCIUM SPEC-SCNC: 9.2 MG/DL (ref 8.6–10.5)
CHLORIDE SERPL-SCNC: 109 MMOL/L (ref 98–107)
CHOLEST SERPL-MCNC: 147 MG/DL (ref 0–200)
CO2 SERPL-SCNC: 24 MMOL/L (ref 22–29)
CREAT SERPL-MCNC: 0.93 MG/DL (ref 0.57–1)
DEPRECATED RDW RBC AUTO: 41.5 FL (ref 37–54)
EGFRCR SERPLBLD CKD-EPI 2021: 76 ML/MIN/1.73
EOSINOPHIL # BLD AUTO: 0.15 10*3/MM3 (ref 0–0.4)
EOSINOPHIL NFR BLD AUTO: 2.5 % (ref 0.3–6.2)
ERYTHROCYTE [DISTWIDTH] IN BLOOD BY AUTOMATED COUNT: 12.2 % (ref 12.3–15.4)
GLOBULIN UR ELPH-MCNC: 2.4 GM/DL
GLUCOSE SERPL-MCNC: 98 MG/DL (ref 65–99)
HCT VFR BLD AUTO: 39 % (ref 34–46.6)
HDLC SERPL-MCNC: 89 MG/DL (ref 40–60)
HGB BLD-MCNC: 12.7 G/DL (ref 12–15.9)
IMM GRANULOCYTES # BLD AUTO: 0.03 10*3/MM3 (ref 0–0.05)
IMM GRANULOCYTES NFR BLD AUTO: 0.5 % (ref 0–0.5)
LDLC SERPL CALC-MCNC: 45 MG/DL (ref 0–100)
LDLC/HDLC SERPL: 0.51 {RATIO}
LYMPHOCYTES # BLD AUTO: 1.7 10*3/MM3 (ref 0.7–3.1)
LYMPHOCYTES NFR BLD AUTO: 27.9 % (ref 19.6–45.3)
MCH RBC QN AUTO: 30.3 PG (ref 26.6–33)
MCHC RBC AUTO-ENTMCNC: 32.6 G/DL (ref 31.5–35.7)
MCV RBC AUTO: 93.1 FL (ref 79–97)
MONOCYTES # BLD AUTO: 0.34 10*3/MM3 (ref 0.1–0.9)
MONOCYTES NFR BLD AUTO: 5.6 % (ref 5–12)
NEUTROPHILS NFR BLD AUTO: 3.82 10*3/MM3 (ref 1.7–7)
NEUTROPHILS NFR BLD AUTO: 62.7 % (ref 42.7–76)
NRBC BLD AUTO-RTO: 0 /100 WBC (ref 0–0.2)
PLATELET # BLD AUTO: 162 10*3/MM3 (ref 140–450)
PMV BLD AUTO: 10.3 FL (ref 6–12)
POTASSIUM SERPL-SCNC: 3.9 MMOL/L (ref 3.5–5.2)
PROT SERPL-MCNC: 6.4 G/DL (ref 6–8.5)
RBC # BLD AUTO: 4.19 10*6/MM3 (ref 3.77–5.28)
SODIUM SERPL-SCNC: 141 MMOL/L (ref 136–145)
T4 FREE SERPL-MCNC: 1.07 NG/DL (ref 0.93–1.7)
TRIGL SERPL-MCNC: 64 MG/DL (ref 0–150)
TSH SERPL DL<=0.05 MIU/L-ACNC: 2.96 UIU/ML (ref 0.27–4.2)
VLDLC SERPL-MCNC: 13 MG/DL (ref 5–40)
WBC NRBC COR # BLD: 6.09 10*3/MM3 (ref 3.4–10.8)

## 2023-10-02 PROCEDURE — 80050 GENERAL HEALTH PANEL: CPT

## 2023-10-02 PROCEDURE — 80061 LIPID PANEL: CPT

## 2023-10-02 PROCEDURE — 36415 COLL VENOUS BLD VENIPUNCTURE: CPT

## 2023-10-02 PROCEDURE — 84439 ASSAY OF FREE THYROXINE: CPT

## 2023-10-11 RX ORDER — SERTRALINE HYDROCHLORIDE 25 MG/1
75 TABLET, FILM COATED ORAL DAILY
Qty: 90 TABLET | Refills: 1 | Status: SHIPPED | OUTPATIENT
Start: 2023-10-11

## 2023-10-13 ENCOUNTER — TELEPHONE (OUTPATIENT)
Dept: FAMILY MEDICINE CLINIC | Facility: CLINIC | Age: 48
End: 2023-10-13
Payer: COMMERCIAL

## 2023-10-13 NOTE — TELEPHONE ENCOUNTER
Hello, you have a referral in your chart that your PCM has put in for you for Gastroenterology that has not been schedule. Please call [phone] to this up. If you are not wanting to use this referral please call our office at [phone].      Thank you.

## 2023-11-13 RX ORDER — SERTRALINE HYDROCHLORIDE 25 MG/1
75 TABLET, FILM COATED ORAL DAILY
Qty: 90 TABLET | Refills: 1 | Status: SHIPPED | OUTPATIENT
Start: 2023-11-13

## 2023-12-04 DIAGNOSIS — Z85.3 HISTORY OF BREAST CANCER: ICD-10-CM

## 2023-12-04 NOTE — TELEPHONE ENCOUNTER
Caller: Baron Digna    Relationship: Self    Best call back number: [phone]    Requested Prescriptions:   Requested Prescriptions     Pending Prescriptions Disp Refills    tamoxifen (NOLVADEX) 10 MG tablet 90 tablet 1     Sig: Take 2 tablets by mouth Daily.        Pharmacy where request should be sent: [id number] [place] [facility] [address] AT [facility] [phone] [facility] [phone]      Last office visit with prescribing clinician: Visit date not found   Last telemedicine visit with prescribing clinician: Visit date not found   Next office visit with prescribing clinician: 3/5/2024       Does the patient have less than a 3 day supply:  [] Yes  [x] No    Schuyler Watkins Rep   12/04/23 11:04 EST

## 2023-12-05 RX ORDER — TAMOXIFEN CITRATE 10 MG/1
20 TABLET ORAL DAILY
Qty: 90 TABLET | Refills: 1 | Status: SHIPPED | OUTPATIENT
Start: 2023-12-05

## 2023-12-12 RX ORDER — SERTRALINE HYDROCHLORIDE 25 MG/1
75 TABLET, FILM COATED ORAL DAILY
Qty: 90 TABLET | Refills: 1 | OUTPATIENT
Start: 2023-12-12

## 2023-12-14 NOTE — TELEPHONE ENCOUNTER
Refill was denied by TORREY Fritz on 12/11/23 however sig states pt takes this three times a day.     This refill will be going on file

## 2023-12-18 RX ORDER — SERTRALINE HYDROCHLORIDE 25 MG/1
75 TABLET, FILM COATED ORAL DAILY
Qty: 90 TABLET | Refills: 0 | Status: SHIPPED | OUTPATIENT
Start: 2023-12-18

## 2024-01-15 RX ORDER — SERTRALINE HYDROCHLORIDE 25 MG/1
75 TABLET, FILM COATED ORAL DAILY
Qty: 90 TABLET | Refills: 0 | Status: SHIPPED | OUTPATIENT
Start: 2024-01-15

## 2024-01-22 ENCOUNTER — OFFICE VISIT (OUTPATIENT)
Dept: FAMILY MEDICINE CLINIC | Facility: CLINIC | Age: 49
End: 2024-01-22
Payer: COMMERCIAL

## 2024-01-22 ENCOUNTER — HOSPITAL ENCOUNTER (OUTPATIENT)
Dept: GENERAL RADIOLOGY | Facility: HOSPITAL | Age: 49
Discharge: HOME | End: 2024-01-22
Admitting: NURSE PRACTITIONER
Payer: COMMERCIAL

## 2024-01-22 VITALS
SYSTOLIC BLOOD PRESSURE: 114 MMHG | HEART RATE: 60 BPM | HEIGHT: 65 IN | BODY MASS INDEX: 28.82 KG/M2 | WEIGHT: 173 LBS | OXYGEN SATURATION: 100 % | DIASTOLIC BLOOD PRESSURE: 54 MMHG

## 2024-01-22 DIAGNOSIS — G89.29 CHRONIC THORACIC BACK PAIN, UNSPECIFIED BACK PAIN LATERALITY: Primary | ICD-10-CM

## 2024-01-22 DIAGNOSIS — M54.6 CHRONIC THORACIC BACK PAIN, UNSPECIFIED BACK PAIN LATERALITY: Primary | ICD-10-CM

## 2024-01-22 DIAGNOSIS — G89.29 CHRONIC THORACIC BACK PAIN, UNSPECIFIED BACK PAIN LATERALITY: ICD-10-CM

## 2024-01-22 DIAGNOSIS — M54.6 CHRONIC THORACIC BACK PAIN, UNSPECIFIED BACK PAIN LATERALITY: ICD-10-CM

## 2024-01-22 PROCEDURE — 72072 X-RAY EXAM THORAC SPINE 3VWS: CPT

## 2024-01-22 PROCEDURE — 99214 OFFICE O/P EST MOD 30 MIN: CPT | Performed by: NURSE PRACTITIONER

## 2024-01-22 RX ORDER — CELECOXIB 100 MG/1
100 CAPSULE ORAL 2 TIMES DAILY
Qty: 30 CAPSULE | Refills: 0 | Status: SHIPPED | OUTPATIENT
Start: 2024-01-22

## 2024-01-22 RX ORDER — METHOCARBAMOL 750 MG/1
750 TABLET, FILM COATED ORAL 2 TIMES DAILY PRN
Qty: 30 TABLET | Refills: 0 | Status: SHIPPED | OUTPATIENT
Start: 2024-01-22

## 2024-01-22 RX ORDER — MULTIPLE VITAMINS W/ MINERALS TAB 9MG-400MCG
1 TAB ORAL DAILY
COMMUNITY

## 2024-01-22 NOTE — PROGRESS NOTES
Chief Complaint  Back Pain    SUBJECTIVE  Digna Baron presents to [facility] for upper back pain for 5 - 6 months. Pt states its an aching and burning pain, worse at night. Pt has seen a Chiropractor in the past, pt is requesting possible MRI.  Patient states that her pain seemed to get worse with the chiropractor, it has improved since she quit seeing him    Pt reports having a lot upper back pain     Pt reports she does have a history of Breast cancer dx 5 years ago- had bilateral mastectomy.  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 He's been clean most of my life    Cancer Maternal Grandfather         Bladder cancer    Diabetes Maternal Grandfather         Extreme kidney failure. One at 0% function    Heart disease Maternal Grandfather         Heart attack when he was 50    Kidney disease Maternal Grandfather     Cancer Paternal Grandmother         Breast cancer    Cancer Paternal Aunt         Gall bladder cancer    Early death Paternal Aunt         Passed from the cancer at 41 yo    Thyroid disease Daughter         Hypothyroidism      Past Surgical History:   Procedure Laterality Date    BREAST SURGERY  2018    Bilateral Mastectomy and implant exchange in May 2019     SECTION  1996    Daughter's Birth    COLONOSCOPY N/A 2023    Procedure: COLONOSCOPY;  Surgeon: Tylor Bahena MD;  Location: [facility] ENDOSCOPY;  Service: General;  Laterality: N/A;  normal colonoscopy    LYMPH NODE BIOPSY  2018    Breast cancer    US GUIDED LYMPH NODE BIOPSY  8/10/2023        Current Outpatient Medications:     BIOTIN  "FORTE PO, Take  by mouth., Disp: , Rfl:     Ferrous Sulfate Dried (FERROUS SULFATE IRON PO), Take  by mouth., Disp: , Rfl:     multivitamin with minerals tablet tablet, Take 1 tablet by mouth Daily., Disp: , Rfl:     Omega-3 Fatty Acids (fish oil) 1000 MG capsule capsule, Take  by mouth Daily With Breakfast., Disp: , Rfl:     sertraline (ZOLOFT) 25 MG tablet, Take 3 tablets by mouth Daily., Disp: 90 tablet, Rfl: 0    tamoxifen (NOLVADEX) 10 MG tablet, Take 2 tablets by mouth Daily., Disp: 90 tablet, Rfl: 1    topiramate (Topamax) 25 MG tablet, 1 tab PO BID, Disp: 60 tablet, Rfl: 5    vitamin B-12 (CYANOCOBALAMIN) 1000 MCG tablet, Take 1 tablet by mouth Daily., Disp: , Rfl:     vitamin C (ASCORBIC ACID) 250 MG tablet, Take 1 tablet by mouth Daily., Disp: , Rfl:     celecoxib (CeleBREX) 100 MG capsule, Take 1 capsule by mouth 2 (Two) Times a Day., Disp: 30 capsule, Rfl: 0    fluticasone (FLONASE) 50 MCG/ACT nasal spray, 2 sprays into the nostril(s) as directed by provider Daily. (Patient not taking: Reported on 1/22/2024), Disp: 1 g, Rfl: 3    methocarbamol (ROBAXIN) 750 MG tablet, Take 1 tablet by mouth 2 (Two) Times a Day As Needed for Muscle Spasms., Disp: 30 tablet, Rfl: 0    Multiple Vitamins-Minerals (ZINC PO), Take  by mouth. (Patient not taking: Reported on 9/19/2023), Disp: , Rfl:     Zinc Acetate, Oral, (ZINC ACETATE PO), Take  by mouth. (Patient not taking: Reported on 1/22/2024), Disp: , Rfl:     OBJECTIVE  Vital Signs:   /54   Pulse 60   Ht 165.1 cm (65\")   Wt 78.5 kg (173 lb)   SpO2 100%   BMI 28.79 kg/m²    Estimated body mass index is 28.79 kg/m² as calculated from the following:    Height as of this encounter: 165.1 cm (65\").    Weight as of this encounter: 78.5 kg (173 lb).     Wt Readings from Last 3 Encounters:   01/22/24 78.5 kg (173 lb)   09/19/23 73 kg (161 lb)   09/18/23 73.3 kg (161 lb 9.6 oz)     BP Readings from Last 3 Encounters:   01/22/24 114/54   09/19/23 105/62   09/18/23 " 119/74       Physical Exam  Vitals reviewed.   Constitutional:       Appearance: Normal appearance. She is well-developed.   HENT:      Head: Normocephalic and atraumatic.      Right Ear: External ear normal.      Left Ear: External ear normal.   Eyes:      Conjunctiva/sclera: Conjunctivae normal.      Pupils: Pupils are equal, round, and reactive to light.   Cardiovascular:      Rate and Rhythm: Normal rate and regular rhythm.      Heart sounds: No murmur heard.     No friction rub. No gallop.   Pulmonary:      Effort: Pulmonary effort is normal.      Breath sounds: Normal breath sounds. No wheezing or rhonchi.   Musculoskeletal:      Thoracic back: Tenderness present. No bony tenderness. Normal range of motion.   Skin:     General: Skin is warm and dry.   Neurological:      Mental Status: She is alert and oriented to person, place, and time.      Cranial Nerves: No cranial nerve deficit.   Psychiatric:         Mood and Affect: Mood and affect normal.         Behavior: Behavior normal.         Thought Content: Thought content normal.         Judgment: Judgment normal.          Result Review    CMP          10/2/2023    13:19   CMP   Glucose 98    BUN 11    Creatinine 0.93    EGFR 76.0    Sodium 141    Potassium 3.9    Chloride 109    Calcium 9.2    Total Protein 6.4    Albumin 4.0    Globulin 2.4    Total Bilirubin 0.2    Alkaline Phosphatase 65    AST (SGOT) 31    ALT (SGPT) 24    Albumin/Globulin Ratio 1.7    BUN/Creatinine Ratio 11.8    Anion Gap 8.0      CBC          7/24/2023    13:47 10/2/2023    13:19   CBC   WBC 5.85  6.09    RBC 4.16  4.19    Hemoglobin 13.0  12.7    Hematocrit 39.2  39.0    MCV 94.2  93.1    MCH 31.3  30.3    MCHC 33.2  32.6    RDW 12.2  12.2    Platelets 185  162        No Images in the past 120 days found..     The above data has been reviewed by POLA Campbell 01/22/2024 10:33 EST.          Patient Care Team:  Brenda Ying APRN as PCP - General (Nurse  Practitioner)  Imani Lopez, RN as Nurse Navigator            ASSESSMENT & PLAN    Diagnoses and all orders for this visit:    1. Chronic thoracic back pain, unspecified back pain laterality (Primary)  -     XR Spine Thoracic 3 View; Future  -     methocarbamol (ROBAXIN) 750 MG tablet; Take 1 tablet by mouth 2 (Two) Times a Day As Needed for Muscle Spasms.  Dispense: 30 tablet; Refill: 0  -     celecoxib (CeleBREX) 100 MG capsule; Take 1 capsule by mouth 2 (Two) Times a Day.  Dispense: 30 capsule; Refill: 0    Discussed with patient that we will treat for potential muscle strain, discussed posture, discussed with patient if she does not see significant improvement/resolves with treatment I will order MRI  Tobacco Use: Medium Risk (1/25/2024)    Patient History     Smoking Tobacco Use: Former     Smokeless Tobacco Use: Never     Passive Exposure: Not on file       Follow Up     Return if symptoms worsen or fail to improve.        Patient was given instructions and counseling regarding her condition or for health maintenance advice. Please see specific information pulled into the AVS if appropriate.   I have reviewed information obtained and documented by others and I have confirmed the accuracy of this documented note.    POLA Campbell

## 2024-02-18 DIAGNOSIS — M54.6 CHRONIC THORACIC BACK PAIN, UNSPECIFIED BACK PAIN LATERALITY: ICD-10-CM

## 2024-02-18 DIAGNOSIS — Z85.3 HISTORY OF BREAST CANCER: ICD-10-CM

## 2024-02-18 DIAGNOSIS — G89.29 CHRONIC THORACIC BACK PAIN, UNSPECIFIED BACK PAIN LATERALITY: ICD-10-CM

## 2024-02-19 RX ORDER — TAMOXIFEN CITRATE 10 MG/1
20 TABLET ORAL DAILY
Qty: 90 TABLET | Refills: 1 | Status: SHIPPED | OUTPATIENT
Start: 2024-02-19

## 2024-02-19 NOTE — TELEPHONE ENCOUNTER
These medications were prescribed for an episode of acute back pain, is the patient still having symptoms?

## 2024-02-19 NOTE — TELEPHONE ENCOUNTER
LMTCB. OK FOR HUB TO RELAY AND SEND BACK : These medications were prescribed for an episode of acute back pain, is the patient still having symptoms?

## 2024-02-19 NOTE — TELEPHONE ENCOUNTER
Name: Digna Baron    Relationship: Self    Best Callback Number: [phone]    HUB PROVIDED THE RELAY MESSAGE FROM THE OFFICE   PATIENT VOICED UNDERSTANDING AND HAS NO FURTHER QUESTIONS AT THIS TIME    ADDITIONAL INFORMATION: SHE IS STILL HAVING SYMPTOMS OF BACK PAIN      No

## 2024-02-19 NOTE — TELEPHONE ENCOUNTER
ROBAXIN  LRF 01/22/2024  LOV 01/22/2024  F/U none on file     CELEBREX  LRF 01/22/2024  LOV 01/22/2024  F/U none on file

## 2024-02-20 RX ORDER — METHOCARBAMOL 750 MG/1
750 TABLET, FILM COATED ORAL 2 TIMES DAILY PRN
Qty: 30 TABLET | Refills: 0 | Status: SHIPPED | OUTPATIENT
Start: 2024-02-20

## 2024-02-20 RX ORDER — CELECOXIB 100 MG/1
100 CAPSULE ORAL 2 TIMES DAILY
Qty: 30 CAPSULE | Refills: 0 | Status: SHIPPED | OUTPATIENT
Start: 2024-02-20

## 2024-02-20 NOTE — TELEPHONE ENCOUNTER
I can send the patient a refill but as we previously discussed if her symptoms persisted after treatment then we needed MRI for further evaluation.  I have placed that order

## 2024-03-01 ENCOUNTER — HOSPITAL ENCOUNTER (OUTPATIENT)
Dept: ULTRASOUND IMAGING | Facility: HOSPITAL | Age: 49
Discharge: HOME | End: 2024-03-01
Admitting: INTERNAL MEDICINE
Payer: COMMERCIAL

## 2024-03-01 DIAGNOSIS — R22.32 AXILLARY LUMP, LEFT: ICD-10-CM

## 2024-03-01 PROCEDURE — 76642 ULTRASOUND BREAST LIMITED: CPT

## 2024-03-04 NOTE — PROGRESS NOTES
Chief Complaint/Care Team   NEW PROVIDER - History of breast cancer    Brenda Ying, AP*  Brenda Ying, APRN    History of Present Illness     Diagnosis: ER+ Left Breast Cancer    Current Treatment: Tamoxifen  Previous Treatment:     ER+ Left Breast Cancer:    - diagnosed at age 44  - Bilateral skin sparing mastectomy and left SLNB on 12/21/18: invastive ductal carcinoma of the left breast, grade 2, 12mm focus, negative margins, closest was 4mm, not associated with DCIS, 1 of 2 LN with micrometastatic disease, pT1c pN1mi (stage IA), ER+, RI+, HER2 negative  - treatment with adjuvant chemotherapy with TC but no radiation.   - on Tamoxifen since 2019  -pt transitioned care to Dr. Ervin on 3/5/2024      Digna Baron is a 49 y.o. female who presents to [facility] for ER+ Left Breast Cancer, she is currently receiving treatment with tamoxifen, she reports tolerating this medication well. She denies any vaginal bleeding, but does reports pain in her back and is pending MRI spine to further evaluate. She reports compliance with zoloft and improvement in mood as a result.    Review of Systems   Constitutional:  Negative for appetite change, diaphoresis, fatigue, fever, unexpected weight gain and unexpected weight loss.   HENT:  Negative for hearing loss, mouth sores, sore throat, swollen glands, trouble swallowing and voice change.    Eyes:  Negative for blurred vision.   Respiratory:  Negative for cough, shortness of breath and wheezing.    Cardiovascular:  Negative for chest pain and palpitations.   Gastrointestinal:  Negative for abdominal pain, blood in stool, constipation, diarrhea, nausea and vomiting.   Endocrine: Negative for cold intolerance and heat intolerance.   Genitourinary:  Negative for difficulty urinating, dysuria, frequency, hematuria and urinary incontinence.   Musculoskeletal:  Negative for arthralgias, back pain and myalgias.   Skin:  Negative  "for rash, skin lesions and wound.   Neurological:  Negative for dizziness, seizures, weakness, numbness and headache.   Hematological:  Does not bruise/bleed easily.   Psychiatric/Behavioral:  Negative for depressed mood. The patient is not nervous/anxious.    All other systems reviewed and are negative.       Oncology/Hematology History Overview Note     ER+ Left Breast Cancer:    - diagnosed at age 44  - Bilateral skin sparing mastectomy and left SLNB on 12/21/18: invastive ductal carcinoma of the left breast, grade 2, 12mm focus, negative margins, closest was 4mm, not associated with DCIS, 1 of 2 LN with micrometastatic disease, pT1c pN1mi (stage IA), ER+, MS+, HER2 negative  - treatment with adjuvant chemotherapy with TC but no radiation.   - on Tamoxifen since 2019     Malignant neoplasm metastatic to lymph nodes   12/28/2018 Initial Diagnosis    Malignant neoplasm metastatic to lymph nodes (HCC)     Malignant neoplasm of lower-outer quadrant of left female breast   12/6/2018 Initial Diagnosis    Malignant neoplasm of lower-outer quadrant of left female breast (HCC)         Objective     Vitals:    03/05/24 1427   BP: 107/64   Pulse: 59   Resp: 18   Temp: 97.5 °F (36.4 °C)   TempSrc: Temporal   SpO2: 97%   Weight: 75.1 kg (165 lb 9.1 oz)   Height: 165.1 cm (65\")   PainSc: 0-No pain     ECOG score: 0         PHQ-9 Total Score:         Physical Exam  Vitals reviewed. Exam conducted with a chaperone present.   Constitutional:       General: She is not in acute distress.     Appearance: Normal appearance.   HENT:      Head: Normocephalic and atraumatic.   Eyes:      Extraocular Movements: Extraocular movements intact.      Conjunctiva/sclera: Conjunctivae normal.   Pulmonary:      Effort: Pulmonary effort is normal.   Musculoskeletal:      Cervical back: Normal range of motion and neck supple.   Skin:     General: Skin is warm and dry.      Findings: No bruising.   Neurological:      Mental Status: She is oriented " to person, place, and time.           Past Medical History     Past Medical History:   Diagnosis Date    Anemia     Slightly anemic throughout my life    Breast cancer     Cancer 2018    Breast    Substance abuse     Have not had any mind or mood altering drugs since  and am in recovery     Current Outpatient Medications on File Prior to Visit   Medication Sig Dispense Refill    BIOTIN FORTE PO Take  by mouth.      celecoxib (CeleBREX) 100 MG capsule Take 1 capsule by mouth 2 (Two) Times a Day. 30 capsule 0    Ferrous Sulfate Dried (FERROUS SULFATE IRON PO) Take  by mouth.      methocarbamol (ROBAXIN) 750 MG tablet Take 1 tablet by mouth 2 (Two) Times a Day As Needed for Muscle Spasms. 30 tablet 0    multivitamin with minerals tablet tablet Take 1 tablet by mouth Daily.      Omega-3 Fatty Acids (fish oil) 1000 MG capsule capsule Take  by mouth Daily With Breakfast.      sertraline (ZOLOFT) 25 MG tablet Take 3 tablets by mouth Daily. 90 tablet 0    tamoxifen (NOLVADEX) 10 MG tablet Take 2 tablets by mouth Daily. 90 tablet 1    topiramate (Topamax) 25 MG tablet 1 tab PO BID 60 tablet 5    vitamin B-12 (CYANOCOBALAMIN) 1000 MCG tablet Take 1 tablet by mouth Daily.      vitamin C (ASCORBIC ACID) 250 MG tablet Take 1 tablet by mouth Daily.      fluticasone (FLONASE) 50 MCG/ACT nasal spray 2 sprays into the nostril(s) as directed by provider Daily. (Patient not taking: Reported on 2024) 1 g 3    Multiple Vitamins-Minerals (ZINC PO) Take  by mouth. (Patient not taking: Reported on 2023)      Zinc Acetate, Oral, (ZINC ACETATE PO) Take  by mouth. (Patient not taking: Reported on 2024)       No current facility-administered medications on file prior to visit.      No Known Allergies  Past Surgical History:   Procedure Laterality Date    BREAST SURGERY  2018    Bilateral Mastectomy and implant exchange in May 2019     SECTION  1996    Daughter's Birth    COLONOSCOPY N/A 2023     Procedure: COLONOSCOPY;  Surgeon: Tylor Bahena MD;  Location: [facility] ENDOSCOPY;  Service: General;  Laterality: N/A;  normal colonoscopy    LYMPH NODE BIOPSY  2018    Breast cancer    US GUIDED LYMPH NODE BIOPSY  8/10/2023     Social History     Socioeconomic History    Marital status:    Tobacco Use    Smoking status: Former     Current packs/day: 0.00     Average packs/day: 1 pack/day for 20.2 years (20.2 ttl pk-yrs)     Types: Cigarettes     Start date: 1992     Quit date: 2012     Years since quittin.6    Smokeless tobacco: Never   Vaping Use    Vaping status: Never Used   Substance and Sexual Activity    Alcohol use: Not Currently     Comment: Heavy    Drug use: Yes     Types: Hashish, Marijuana, Methamphetamines     Comment: 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 He's been clean most of my life    Cancer Maternal Grandfather         Bladder cancer    Diabetes Maternal Grandfather         Extreme kidney failure. One at 0% function    Heart disease Maternal Grandfather         Heart attack when he was 50    Kidney disease Maternal Grandfather     Cancer Paternal Grandmother         Breast cancer    Cancer Paternal Aunt         Gall bladder cancer    Early death Paternal Aunt         Passed from the cancer at 39 yo    Thyroid disease Daughter         Hypothyroidism       Results     Result Review   The following data was reviewed by: Johnathon Ervin MD on 2024:  Lab Results   Component Value Date    HGB 12.7 10/02/2023    HCT 39.0 10/02/2023    MCV 93.1 10/02/2023     10/02/2023    WBC 6.09 10/02/2023    NEUTROABS 3.82 10/02/2023    LYMPHSABS 1.70 10/02/2023    MONOSABS 0.34 10/02/2023    EOSABS 0.15 10/02/2023    BASOSABS 0.05 10/02/2023     Lab Results   Component Value Date     GLUCOSE 98 10/02/2023    BUN 11 10/02/2023    CREATININE 0.93 10/02/2023     10/02/2023    K 3.9 10/02/2023     (H) 10/02/2023    CO2 24.0 10/02/2023    CALCIUM 9.2 10/02/2023    PROTEINTOT 6.4 10/02/2023    ALBUMIN 4.0 10/02/2023    BILITOT 0.2 10/02/2023    ALKPHOS 65 10/02/2023    AST 31 10/02/2023    ALT 24 10/02/2023     Lab Results   Component Value Date    FREET4 1.07 10/02/2023    TSH 2.960 10/02/2023           No radiology results for the last day  US Breast Left Limited    Result Date: 3/1/2024  Impression: Benign focused ultrasound examination of the left axilla.   RECOMMENDATION(S):  CLINICAL EVALUATION.   BIRADS:  DIAGNOSTIC CATEGORY 2--BENIGN FINDING   PLEASE NOTE:  THE AMERICAN CANCER SOCIETY NOW RECOMMENDS THAT ALL WOMEN WITH >20% LIFETIME RISK OF BREAST CANCER UNDERGO ANNUAL SCREENING BREAST MRI AND WOMEN WITH 15-20% SPEAK WITH THEIR DOCTORS ABOUT ANNUAL SCREENING BREAST MRI.  A NORMAL ULTRASOUND EXAMINATION DOES NOT EXCLUDE THE POSSIBILITY OF BREAST CANCER.  A CLINICALLY SUSPICIOUS PALPABLE LUMP SHOULD BE BIOPSIED.     MACKENZIE FRENCH MD       Electronically Signed and Approved By: MACKENZIE FRENCH MD on 3/01/2024 at 14:20              Assessment & Plan     Diagnoses and all orders for this visit:    1. Malignant neoplasm of lower-outer quadrant of left breast of female, estrogen receptor positive (Primary)  -     CBC & Differential; Future  -     Comprehensive Metabolic Panel; Future  -     NM Bone Scan Whole Body; Future    2. Pain, upper back  -     NM Bone Scan Whole Body; Future        Digna Baron is a 49 y.o. female who presents to [facility] for ER+ Left Breast Cancer, she is currently receiving treatment with tamoxifen,    -discussed results of most recent US of breast which was negative from 3/1/2024  -pt is s/p mastectomy so will obtain imaging based on pt symptoms  -discussed plan to obtain BCI to determine if she would benefit  from 10 years of endocrine therapy  -overall pt tolerating tamoxifen well, recommend continuing, provided refill today    Back pain  -pt pending MRI spine ordered by PCP  -ordered NM bone scan to assess for disease recurrence    -plan for pt follow up in 1 month to discuss NM bone scan and BCI results (if back) with labs CBC, CMP.    Please note that portions of this note were completed with a voice recognition program.    Electronically signed by Johnathon Ervin MD, 03/05/24, 4:44 PM EST.      Follow Up     I spent 30 minutes caring for Digna on this date of service. This time includes time spent by me in the following activities:preparing for the visit, reviewing tests, obtaining and/or reviewing a separately obtained history, performing a medically appropriate examination and/or evaluation , counseling and educating the patient/family/caregiver, ordering medications, tests, or procedures, referring and communicating with other health care professionals , documenting information in the medical record, independently interpreting results and communicating that information with the patient/family/caregiver, and care coordination.    This is an acute or chronic illness that poses a threat to life or bodily function. The above treatment plan involves a high risk of complications and/or mortality of patient management.    The patient was seen and examined. Work by the provider also included review and/or ordering of lab tests, review and/or ordering of radiology tests, review and/or ordering of medicine tests, discussion with other physicians or providers, independent review of data, obtaining old records, review/summation of old records, and/or other review.    I have reviewed the family history, social history, and past medical history for this patient. Previous information and data has been reviewed and updated as needed. I have reviewed and verified the chief complaint, history, and other documentation. The patient  was interviewed and examined in the clinic and the chart reviewed. The previous observations, recommendations, and conclusions were reviewed including those of other providers.     The plan was discussed with the patient and/or family. The patient was given time to ask questions and these questions were answered. At the conclusion of their visit they had no additional questions or concerns and all questions were answered to their satisfaction.    Patient was given instructions and counseling regarding her condition or for health maintenance advice. Please see specific information pulled into the AVS if appropriate.

## 2024-03-05 ENCOUNTER — OFFICE VISIT (OUTPATIENT)
Dept: ONCOLOGY | Facility: HOSPITAL | Age: 49
End: 2024-03-05
Payer: COMMERCIAL

## 2024-03-05 VITALS
TEMPERATURE: 97.5 F | RESPIRATION RATE: 18 BRPM | DIASTOLIC BLOOD PRESSURE: 64 MMHG | HEIGHT: 65 IN | SYSTOLIC BLOOD PRESSURE: 107 MMHG | WEIGHT: 165.57 LBS | HEART RATE: 59 BPM | OXYGEN SATURATION: 97 % | BODY MASS INDEX: 27.58 KG/M2

## 2024-03-05 DIAGNOSIS — C50.512 MALIGNANT NEOPLASM OF LOWER-OUTER QUADRANT OF LEFT BREAST OF FEMALE, ESTROGEN RECEPTOR POSITIVE: Primary | ICD-10-CM

## 2024-03-05 DIAGNOSIS — M54.9 PAIN, UPPER BACK: ICD-10-CM

## 2024-03-05 DIAGNOSIS — Z17.0 MALIGNANT NEOPLASM OF LOWER-OUTER QUADRANT OF LEFT BREAST OF FEMALE, ESTROGEN RECEPTOR POSITIVE: Primary | ICD-10-CM

## 2024-03-05 PROCEDURE — 99214 OFFICE O/P EST MOD 30 MIN: CPT | Performed by: INTERNAL MEDICINE

## 2024-03-05 PROCEDURE — G0463 HOSPITAL OUTPT CLINIC VISIT: HCPCS | Performed by: INTERNAL MEDICINE

## 2024-03-06 ENCOUNTER — OFFICE VISIT (OUTPATIENT)
Dept: OTOLARYNGOLOGY | Facility: CLINIC | Age: 49
End: 2024-03-06
Payer: COMMERCIAL

## 2024-03-06 ENCOUNTER — PATIENT ROUNDING (BHMG ONLY) (OUTPATIENT)
Dept: OTOLARYNGOLOGY | Facility: CLINIC | Age: 49
End: 2024-03-06

## 2024-03-06 VITALS
TEMPERATURE: 97.4 F | SYSTOLIC BLOOD PRESSURE: 113 MMHG | HEART RATE: 66 BPM | DIASTOLIC BLOOD PRESSURE: 74 MMHG | OXYGEN SATURATION: 96 %

## 2024-03-06 DIAGNOSIS — R49.0 VOICE HOARSENESS: Primary | ICD-10-CM

## 2024-03-06 DIAGNOSIS — J31.0 CHRONIC RHINITIS: ICD-10-CM

## 2024-03-06 DIAGNOSIS — R09.89 THROAT CLEARING: ICD-10-CM

## 2024-03-06 PROCEDURE — 31575 DIAGNOSTIC LARYNGOSCOPY: CPT | Performed by: OTOLARYNGOLOGY

## 2024-03-06 PROCEDURE — 99203 OFFICE O/P NEW LOW 30 MIN: CPT | Performed by: OTOLARYNGOLOGY

## 2024-03-06 NOTE — PROGRESS NOTES
A [employer] message has been send to the patient for PATIENT ROUNDING with [facility].

## 2024-03-06 NOTE — PROGRESS NOTES
"Patient Name: Digna Baron   Visit Date: 03/06/2024   Patient ID: [id number]  Provider: Reese Freitas MD    Sex: female  Location: [facility]   YOB: 1975  Location Address: [address] [place] [address], [address],?[place]?[id number]    Primary Care Provider Brenda Ying APRN  Location Phone: [phone]    Referring Provider: POLA Nguyễn        Chief Complaint  CHronic hoarseness    History of Present Illness  Digna Baron is a 49 y.o. female with past medical history significant for invasive ductal carcinoma of the left breast who presents to [facility] today as a consult from POLA Nguyễn for evaluation of hoarseness.  She tells me that in May 2023 she became acutely ill and at some point lost her voice completely.  For around 6 months after that she noticed that when she would try to raise the volume of her voice it would often \"cut off\".  This was not associated with any pain with phonation.  She does report throat clearing at the time which resolved but restarted around a month ago.  She also experiences some postnasal drainage.  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  She underwent a CT scan of her chest with contrast on 8/8/2023 which revealed a left lower lobe granuloma and no suspicious pulmonary nodules.  She also underwent PFTs on 8/18/2023 which were normal.  She reports occasional reflux depending on her diet.  She has not undergone any previous neck or thoracic surgeries.  She is a former smoker who quit in 2012.      Past Medical History:   Diagnosis Date    Anemia     Slightly anemic throughout my life    Breast cancer     Cancer 11/26/2018    Breast    Substance abuse     Have not had any mind or mood altering drugs since 2010 and am in recovery       Past Surgical History:   Procedure Laterality Date    BREAST SURGERY  12/21/2018    Bilateral Mastectomy and implant exchange " in May 2019     SECTION  1996    Daughter's Birth    COLONOSCOPY N/A 2023    Procedure: COLONOSCOPY;  Surgeon: Tylor Bahena MD;  Location: [facility] ENDOSCOPY;  Service: General;  Laterality: N/A;  normal colonoscopy    LYMPH NODE BIOPSY  2018    Breast cancer    US GUIDED LYMPH NODE BIOPSY  8/10/2023         Current Outpatient Medications:     BIOTIN FORTE PO, Take  by mouth., Disp: , Rfl:     celecoxib (CeleBREX) 100 MG capsule, Take 1 capsule by mouth 2 (Two) Times a Day., Disp: 30 capsule, Rfl: 0    Ferrous Sulfate Dried (FERROUS SULFATE IRON PO), Take  by mouth., Disp: , Rfl:     fluticasone (FLONASE) 50 MCG/ACT nasal spray, 2 sprays into the nostril(s) as directed by provider Daily., Disp: 1 g, Rfl: 3    methocarbamol (ROBAXIN) 750 MG tablet, Take 1 tablet by mouth 2 (Two) Times a Day As Needed for Muscle Spasms., Disp: 30 tablet, Rfl: 0    multivitamin with minerals tablet tablet, Take 1 tablet by mouth Daily., Disp: , Rfl:     Omega-3 Fatty Acids (fish oil) 1000 MG capsule capsule, Take  by mouth Daily With Breakfast., Disp: , Rfl:     sertraline (ZOLOFT) 25 MG tablet, Take 3 tablets by mouth Daily., Disp: 90 tablet, Rfl: 0    tamoxifen (NOLVADEX) 10 MG tablet, Take 2 tablets by mouth Daily., Disp: 90 tablet, Rfl: 1    topiramate (Topamax) 25 MG tablet, 1 tab PO BID, Disp: 60 tablet, Rfl: 5    vitamin B-12 (CYANOCOBALAMIN) 1000 MCG tablet, Take 1 tablet by mouth Daily., Disp: , Rfl:     vitamin C (ASCORBIC ACID) 250 MG tablet, Take 1 tablet by mouth Daily., Disp: , Rfl:     Zinc Acetate, Oral, (ZINC ACETATE PO), Take  by mouth., Disp: , Rfl:     Multiple Vitamins-Minerals (ZINC PO), Take  by mouth., Disp: , Rfl:      No Known Allergies    Social History     Tobacco Use    Smoking status: Former     Current packs/day: 0.00     Average packs/day: 1 pack/day for 20.2 years (20.2 ttl pk-yrs)     Types: Cigarettes     Start date: 1992     Quit date: 2012     Years since  quittin.6    Smokeless tobacco: Never   Vaping Use    Vaping status: Never Used   Substance Use Topics    Alcohol use: Not Currently     Comment: Heavy    Drug use: Yes     Types: Hashish, Marijuana, Methamphetamines     Comment: Clean from all drugs and alcohol since 10/2010        Objective     Vital Signs:   /74   Pulse 66   Temp 97.4 °F (36.3 °C)   SpO2 96%       Physical Exam    General: Well developed, well nourished patient of stated age in no acute distress. Voice is strong and clear.   Head: Normocephalic and atraumatic.  Face: No lesions.  Bilateral parotid and submandibular glands are unremarkable.  Stensen's and Warthin's ducts are productive of clear saliva bilaterally.  House-Brackmann I/VI     bilaterally.   muscles and temporomandibular joint nontender to palpation.  No TMJ crepitus.  Eyes: PERRLA, sclerae anicteric, no conjunctival injection. Extraocular movements are intact and full. No nystagmus.   Ears: Auricles are normal in appearance. Bilateral external auditory canals are unremarkable. Bilateral tympanic membranes are clear and without effusion. Hearing normal to conversational voice.   Nose: External nose is normal in appearance. Bilateral nares are patent with normal appearing mucosa. Septum midline. Turbinates are unremarkable. No lesions.   Oral Cavity: Lips are normal in appearance. Oral mucosa is unremarkable. Gingiva is unremarkable.  Partial dentition for age. Tongue is unremarkable with good movement. Hard palate is unremarkable.   Oropharynx: Soft palate is unremarkable with full movement. Uvula is unremarkable. Bilateral tonsils are unremarkable. Posterior oropharynx with mild cobblestoning.   Larynx and hypopharynx: Deferred secondary to gag reflex.  Neck: Supple.  No mass.  Nontender to palpation.  Trachea midline. Thyroid normal size and without nodules to palpation.   Lymphatic: No lymphadenopathy upon palpation.  Respiratory: Clear to auscultation  bilaterally, nonlabored respirations    Cardiovascular: RRR, no murmurs, rubs, or gallops,   Psychiatric: Appropriate affect, cooperative   Neurologic: Oriented x 3, strength symmetric in all extremities, Cranial Nerves II-XII are grossly intact to confrontation   Skin: Warm and dry. No rashes.    Procedures   Procedure: Flexible fiberoptic nasolaryngoscopy.    Indications: History of hoarseness and throat clearing in a former smoker.    Summary: The patient's bilateral nares were decongested and anesthetized with Afrin and lidocaine sprays respectively. After giving the medications ample time to take effect flexible fiberoptic scope was inserted in the patient's right naris revealing a normal-appearing nasal cavity and nasopharynx. The base of tongue, vallecula, epiglottis, aryepiglottic folds, and arytenoid cartilages are all normal-appearing aside from mild cobblestoning. The piriform sinuses were normal in appearance. The bilateral false and true vocal folds are normal in appearance and adducted and abducted normally. The subglottis was widely patent. The patient tolerated the procedure well.        Result Review :               Assessment and Plan    Diagnoses and all orders for this visit:    1. Voice hoarseness (Primary)    2. Throat clearing    3. Chronic rhinitis    Impressions and findings were discussed at great length.  Currently, she is seen for evaluation after experiencing a 6-month period of hoarseness/dysphonia and throat clearing following a likely viral upper respiratory tract infection.  Examination today reveals mild cobblestoning of her posterior pharynx.  Fortunately, her voice has returned to normal and we discussed the differential for her symptoms.  At this point, I have recommended that she continue with the fluticasone and consider other methods for allergy management.  She may follow-up with me on an as-needed basis.        Follow Up   No follow-ups on file.  Patient was given  instructions and counseling regarding her condition or for health maintenance advice. Please see specific information pulled into the AVS if appropriate.

## 2024-03-08 ENCOUNTER — PATIENT MESSAGE (OUTPATIENT)
Dept: FAMILY MEDICINE CLINIC | Facility: CLINIC | Age: 49
End: 2024-03-08
Payer: COMMERCIAL

## 2024-03-11 ENCOUNTER — TELEPHONE (OUTPATIENT)
Dept: FAMILY MEDICINE CLINIC | Facility: CLINIC | Age: 49
End: 2024-03-11
Payer: COMMERCIAL

## 2024-03-11 ENCOUNTER — HOSPITAL ENCOUNTER (OUTPATIENT)
Dept: NUCLEAR MEDICINE | Facility: HOSPITAL | Age: 49
Discharge: HOME | End: 2024-03-11
Payer: COMMERCIAL

## 2024-03-11 DIAGNOSIS — Z17.0 MALIGNANT NEOPLASM OF LOWER-OUTER QUADRANT OF LEFT BREAST OF FEMALE, ESTROGEN RECEPTOR POSITIVE: ICD-10-CM

## 2024-03-11 DIAGNOSIS — C50.512 MALIGNANT NEOPLASM OF LOWER-OUTER QUADRANT OF LEFT BREAST OF FEMALE, ESTROGEN RECEPTOR POSITIVE: ICD-10-CM

## 2024-03-11 DIAGNOSIS — M54.9 PAIN, UPPER BACK: ICD-10-CM

## 2024-03-11 PROCEDURE — 78306 BONE IMAGING WHOLE BODY: CPT

## 2024-03-11 PROCEDURE — A9503 TC99M MEDRONATE: HCPCS | Performed by: INTERNAL MEDICINE

## 2024-03-11 PROCEDURE — 0 TECHNETIUM MEDRONATE KIT: Performed by: INTERNAL MEDICINE

## 2024-03-11 RX ORDER — TC 99M MEDRONATE 20 MG/10ML
23.3 INJECTION, POWDER, LYOPHILIZED, FOR SOLUTION INTRAVENOUS
Status: COMPLETED | OUTPATIENT
Start: 2024-03-11 | End: 2024-03-11

## 2024-03-11 RX ADMIN — TC 99M MEDRONATE 23.3 MILLICURIE: 20 INJECTION, POWDER, LYOPHILIZED, FOR SOLUTION INTRAVENOUS at 09:15

## 2024-03-11 NOTE — TELEPHONE ENCOUNTER
Enma Sawyer, once you get the results of your scan from Monday, we can then determine if we still need the MRI.  If we see anything on the scan that explains your back pain and we do not need to further evaluate, then we could cancel the MRI at that time.  But the MRI is a different type of imaging that we will be looking more specifically at your spine, so it may still be needed

## 2024-03-11 NOTE — TELEPHONE ENCOUNTER
----- Message from Daria Collins MA sent at 3/11/2024  6:49 AM EDT -----  Regarding: FW: MRI  Contact: [phone]    ----- Message -----  From: Digna Baron  Sent: 3/8/2024  11:59 AM EDT  To: [facility] Clinical Pool  Subject: MRI                                              Good morning, my Oncologist recently scheduled me for a full body scan scheduled for this Monday, the 11th. That being said, do you think the MRI would still be necessary?

## 2024-03-18 ENCOUNTER — OFFICE VISIT (OUTPATIENT)
Dept: PULMONOLOGY | Facility: CLINIC | Age: 49
End: 2024-03-18
Payer: COMMERCIAL

## 2024-03-18 VITALS
HEIGHT: 65 IN | RESPIRATION RATE: 14 BRPM | WEIGHT: 162.8 LBS | OXYGEN SATURATION: 98 % | BODY MASS INDEX: 27.12 KG/M2 | SYSTOLIC BLOOD PRESSURE: 119 MMHG | TEMPERATURE: 98 F | DIASTOLIC BLOOD PRESSURE: 79 MMHG | HEART RATE: 69 BPM

## 2024-03-18 DIAGNOSIS — R09.82 POSTNASAL DRIP: Primary | ICD-10-CM

## 2024-03-18 DIAGNOSIS — R05.3 CHRONIC COUGH: ICD-10-CM

## 2024-03-18 PROCEDURE — 99213 OFFICE O/P EST LOW 20 MIN: CPT | Performed by: INTERNAL MEDICINE

## 2024-03-18 RX ORDER — SERTRALINE HYDROCHLORIDE 25 MG/1
75 TABLET, FILM COATED ORAL DAILY
Qty: 90 TABLET | Refills: 0 | Status: SHIPPED | OUTPATIENT
Start: 2024-03-18
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
## 2024-03-29 ENCOUNTER — HOSPITAL ENCOUNTER (OUTPATIENT)
Dept: MRI IMAGING | Facility: HOSPITAL | Age: 49
Discharge: HOME | End: 2024-03-29
Admitting: INTERNAL MEDICINE
Payer: COMMERCIAL

## 2024-03-29 DIAGNOSIS — G89.29 CHRONIC THORACIC BACK PAIN, UNSPECIFIED BACK PAIN LATERALITY: ICD-10-CM

## 2024-03-29 DIAGNOSIS — M54.6 CHRONIC THORACIC BACK PAIN, UNSPECIFIED BACK PAIN LATERALITY: ICD-10-CM

## 2024-03-29 PROCEDURE — 72146 MRI CHEST SPINE W/O DYE: CPT

## 2024-04-01 DIAGNOSIS — G89.29 CHRONIC THORACIC BACK PAIN, UNSPECIFIED BACK PAIN LATERALITY: Primary | ICD-10-CM

## 2024-04-01 DIAGNOSIS — M51.34 DDD (DEGENERATIVE DISC DISEASE), THORACIC: ICD-10-CM

## 2024-04-01 DIAGNOSIS — M54.6 CHRONIC THORACIC BACK PAIN, UNSPECIFIED BACK PAIN LATERALITY: Primary | ICD-10-CM

## 2024-04-15 RX ORDER — SERTRALINE HYDROCHLORIDE 25 MG/1
75 TABLET, FILM COATED ORAL DAILY
Qty: 90 TABLET | Refills: 0 | Status: SHIPPED | OUTPATIENT
Start: 2024-04-15

## 2024-05-07 ENCOUNTER — TREATMENT (OUTPATIENT)
Dept: PHYSICAL THERAPY | Facility: CLINIC | Age: 49
End: 2024-05-07
Payer: COMMERCIAL

## 2024-05-07 ENCOUNTER — TELEPHONE (OUTPATIENT)
Dept: NEUROSURGERY | Facility: CLINIC | Age: 49
End: 2024-05-07

## 2024-05-07 DIAGNOSIS — M54.6 CHRONIC THORACIC BACK PAIN, UNSPECIFIED BACK PAIN LATERALITY: Primary | ICD-10-CM

## 2024-05-07 DIAGNOSIS — G89.29 CHRONIC THORACIC BACK PAIN, UNSPECIFIED BACK PAIN LATERALITY: Primary | ICD-10-CM

## 2024-05-07 DIAGNOSIS — M51.34 DDD (DEGENERATIVE DISC DISEASE), THORACIC: ICD-10-CM

## 2024-05-07 DIAGNOSIS — M54.50 CHRONIC LOW BACK PAIN WITHOUT SCIATICA, UNSPECIFIED BACK PAIN LATERALITY: ICD-10-CM

## 2024-05-07 DIAGNOSIS — R29.3 POOR POSTURE: ICD-10-CM

## 2024-05-07 DIAGNOSIS — G89.29 CHRONIC LOW BACK PAIN WITHOUT SCIATICA, UNSPECIFIED BACK PAIN LATERALITY: ICD-10-CM

## 2024-05-07 DIAGNOSIS — R19.8 ABDOMINAL WEAKNESS: ICD-10-CM

## 2024-05-07 PROCEDURE — 97161 PT EVAL LOW COMPLEX 20 MIN: CPT

## 2024-05-07 PROCEDURE — 97530 THERAPEUTIC ACTIVITIES: CPT

## 2024-05-07 NOTE — TELEPHONE ENCOUNTER
LVM to call office- need to nikhil 6/13/2024 appt with Dr Rose    HUB can schedule 1st available new pt appt    Thanks

## 2024-05-08 ENCOUNTER — TELEPHONE (OUTPATIENT)
Dept: ONCOLOGY | Facility: HOSPITAL | Age: 49
End: 2024-05-08

## 2024-05-08 NOTE — TELEPHONE ENCOUNTER
Caller: Digna Baron    Relationship to patient: Self    Best call back number:     [phone]     Chief complaint: PT NEEDS TO R/S    Type of visit: FOLLOW UP    Requested date: FIRST AVAILABLE AS LONG AS THEY ARE NOT SCHEDULED AT THE SAME TIME AS HER PT APPTS.     If rescheduling, when is the original appointment: 04/12/24

## 2024-05-13 RX ORDER — SERTRALINE HYDROCHLORIDE 25 MG/1
75 TABLET, FILM COATED ORAL DAILY
Qty: 270 TABLET | Refills: 0 | Status: SHIPPED | OUTPATIENT
Start: 2024-05-13

## 2024-05-13 RX ORDER — FLUTICASONE PROPIONATE 50 MCG
2 SPRAY, SUSPENSION (ML) NASAL DAILY
Qty: 18.2 ML | Refills: 3 | Status: SHIPPED | OUTPATIENT
Start: 2024-05-13

## 2024-05-15 ENCOUNTER — TREATMENT (OUTPATIENT)
Dept: PHYSICAL THERAPY | Facility: CLINIC | Age: 49
End: 2024-05-15
Payer: COMMERCIAL

## 2024-05-15 DIAGNOSIS — M51.34 DDD (DEGENERATIVE DISC DISEASE), THORACIC: ICD-10-CM

## 2024-05-15 DIAGNOSIS — G89.29 CHRONIC LOW BACK PAIN WITHOUT SCIATICA, UNSPECIFIED BACK PAIN LATERALITY: ICD-10-CM

## 2024-05-15 DIAGNOSIS — G89.29 CHRONIC THORACIC BACK PAIN, UNSPECIFIED BACK PAIN LATERALITY: Primary | ICD-10-CM

## 2024-05-15 DIAGNOSIS — R29.3 POOR POSTURE: ICD-10-CM

## 2024-05-15 DIAGNOSIS — M54.6 CHRONIC THORACIC BACK PAIN, UNSPECIFIED BACK PAIN LATERALITY: Primary | ICD-10-CM

## 2024-05-15 DIAGNOSIS — R19.8 ABDOMINAL WEAKNESS: ICD-10-CM

## 2024-05-15 DIAGNOSIS — M54.50 CHRONIC LOW BACK PAIN WITHOUT SCIATICA, UNSPECIFIED BACK PAIN LATERALITY: ICD-10-CM

## 2024-05-15 PROCEDURE — 97110 THERAPEUTIC EXERCISES: CPT

## 2024-05-15 PROCEDURE — 97140 MANUAL THERAPY 1/> REGIONS: CPT

## 2024-05-15 PROCEDURE — 97530 THERAPEUTIC ACTIVITIES: CPT

## 2024-05-15 NOTE — PROGRESS NOTES
Physical Therapy Daily Treatment Note                      [facility] [address] [place] [address]    Patient: Digna Baron   : 1975  Diagnosis/ICD-10 Code:  Chronic thoracic back pain, unspecified back pain laterality [M54.6, G89.29]  Referring practitioner: POLA Nguyễn  Date of Initial Visit: Type: THERAPY  Noted: 2024  Today's Date: 5/15/2024  Patient seen for 2 sessions           Subjective   The patient reported that there are some days where her back feels pretty good, but other days (like today) are not so good.     Objective   See Exercise, Manual, and Modality Logs for complete treatment.     Assessment/Plan  Pt tolerated today's session well. Pt tolerated manual treatments well. PT just beginning to address deficits outlined in the initial evaluation, as this is pt's first treatment session. Skilled therapy still required in order to improve back pain. Continue POC.        Timed:  Manual Therapy:    8   mins  74060;  Therapeutic Exercise:    15     mins  03026;     Neuromuscular Ana:   0    mins  25740;    Therapeutic Activity:     8     mins  57693;     Gait Trainin     mins  98582;     Aquatics                         0      mins  37007    Un-timed:  Mechanical Traction      0     mins  10682  Dry Needling     0     mins self-pay  Electrical Stimulation:    0     mins  87473 ( );      Timed Treatment:    31  mins   Total Treatment:     31   mins    Sowmya Rajan PT    Electronically signed 5/15/2024    [place] License: PT - [id number]

## 2024-05-19 DIAGNOSIS — Z85.3 HISTORY OF BREAST CANCER: ICD-10-CM

## 2024-05-20 RX ORDER — TAMOXIFEN CITRATE 10 MG/1
20 TABLET ORAL DAILY
Qty: 90 TABLET | Refills: 1 | Status: SHIPPED | OUTPATIENT
Start: 2024-05-20

## 2024-05-24 ENCOUNTER — TREATMENT (OUTPATIENT)
Dept: PHYSICAL THERAPY | Facility: CLINIC | Age: 49
End: 2024-05-24
Payer: COMMERCIAL

## 2024-05-24 DIAGNOSIS — R19.8 ABDOMINAL WEAKNESS: ICD-10-CM

## 2024-05-24 DIAGNOSIS — G89.29 CHRONIC THORACIC BACK PAIN, UNSPECIFIED BACK PAIN LATERALITY: Primary | ICD-10-CM

## 2024-05-24 DIAGNOSIS — M51.34 DDD (DEGENERATIVE DISC DISEASE), THORACIC: ICD-10-CM

## 2024-05-24 DIAGNOSIS — R29.3 POOR POSTURE: ICD-10-CM

## 2024-05-24 DIAGNOSIS — M54.50 CHRONIC LOW BACK PAIN WITHOUT SCIATICA, UNSPECIFIED BACK PAIN LATERALITY: ICD-10-CM

## 2024-05-24 DIAGNOSIS — M54.6 CHRONIC THORACIC BACK PAIN, UNSPECIFIED BACK PAIN LATERALITY: Primary | ICD-10-CM

## 2024-05-24 DIAGNOSIS — G89.29 CHRONIC LOW BACK PAIN WITHOUT SCIATICA, UNSPECIFIED BACK PAIN LATERALITY: ICD-10-CM

## 2024-05-24 NOTE — PROGRESS NOTES
Physical Therapy Daily Treatment Note                      [facility] [address] [place] [address]    Patient: Digna Baron   : 1975  Diagnosis/ICD-10 Code:  Chronic thoracic back pain, unspecified back pain laterality [M54.6, G89.29]  Referring practitioner: POLA Nguyễn  Date of Initial Visit: Type: THERAPY  Noted: 2024  Today's Date: 2024  Patient seen for 3 sessions           Subjective   The patient reported that her back is pretty good today. There are times when it hurts and times when it doesn't.     Objective   See Exercise, Manual, and Modality Logs for complete treatment.     Assessment/Plan  Pt tolerated today's session well. Pt didn't have the same oblique pain during trunk rotations today as she did at hr last treatment session. This could indicate improved oblique strength and endurance. Pt's thoracic pain has started to migrate down into her lumbar spine and sacrum. Skilled therapy still required in order to continue improving back pain so that she has increased tolerance to daily functional activities. Continue POC.       Timed:  Manual Therapy:    8     mins  96644;  Therapeutic Exercise:    23     mins  13299;     Neuromuscular Ana:   0    mins  67443;    Therapeutic Activity:     0     mins  37900;     Gait Trainin     mins  19156;     Aquatics                         0      mins  34057    Un-timed:  Mechanical Traction      0     mins  25523  Dry Needling     0     mins self-pay  Electrical Stimulation:    0     mins  15406 ( );      Timed Treatment:   31   mins   Total Treatment:     31   mins    Sowmya Rajan PT    Electronically signed 2024    [place] License: PT - [id number]

## 2024-05-30 ENCOUNTER — TREATMENT (OUTPATIENT)
Dept: PHYSICAL THERAPY | Facility: CLINIC | Age: 49
End: 2024-05-30
Payer: COMMERCIAL

## 2024-05-30 DIAGNOSIS — R29.3 POOR POSTURE: ICD-10-CM

## 2024-05-30 DIAGNOSIS — R19.8 ABDOMINAL WEAKNESS: ICD-10-CM

## 2024-05-30 DIAGNOSIS — M51.34 DDD (DEGENERATIVE DISC DISEASE), THORACIC: ICD-10-CM

## 2024-05-30 DIAGNOSIS — G89.29 CHRONIC THORACIC BACK PAIN, UNSPECIFIED BACK PAIN LATERALITY: Primary | ICD-10-CM

## 2024-05-30 DIAGNOSIS — G89.29 CHRONIC LOW BACK PAIN WITHOUT SCIATICA, UNSPECIFIED BACK PAIN LATERALITY: ICD-10-CM

## 2024-05-30 DIAGNOSIS — M54.50 CHRONIC LOW BACK PAIN WITHOUT SCIATICA, UNSPECIFIED BACK PAIN LATERALITY: ICD-10-CM

## 2024-05-30 DIAGNOSIS — M54.6 CHRONIC THORACIC BACK PAIN, UNSPECIFIED BACK PAIN LATERALITY: Primary | ICD-10-CM

## 2024-05-30 NOTE — PROGRESS NOTES
Physical Therapy Daily Treatment Note                      [facility] [address] [place] [address]    Patient: Digna Baron   : 1975  Diagnosis/ICD-10 Code:  Chronic thoracic back pain, unspecified back pain laterality [M54.6, G89.29]  Referring practitioner: POLA Nguyễn  Date of Initial Visit: Type: THERAPY  Noted: 2024  Today's Date: 2024  Patient seen for 4 sessions           Subjective   The patient reported that she was doing her HEP at home and her weight bench collapsed underneath her while she was doing her superman exercise. When the weight bench collapsed, she hit her face on a dumbbell that was lying on the floor. She had a gash above her eye and closed it with butterfly bandages. Now her eye is black and swollen. It is okay though.     Objective   See Exercise, Manual, and Modality Logs for complete treatment.     Assessment/Plan  Pt tolerated today's session well. Pt responded well to manual treatments. PT was able to obtain a few cavitations from the thoracic spine and right sided lower costovertebral joints. Skilled therapy still required in order to continue improving back pain so that she has increased tolerance to daily functional activities. Continue POC.        Timed:  Manual Therapy:    8     mins  22549;  Therapeutic Exercise:    12     mins  57727;     Neuromuscular Ana:   0    mins  68650;    Therapeutic Activity:     13     mins  64158;     Gait Trainin     mins  77011;     Aquatics                         0      mins  15575    Un-timed:  Mechanical Traction      0     mins  70009  Dry Needling     0     mins self-pay  Electrical Stimulation:    0     mins  63821 ( );      Timed Treatment:   33   mins   Total Treatment:     33   mins    Sowmya Rajan PT    Electronically signed 2024    [place] License: PT - [id number]

## 2024-06-03 ENCOUNTER — TELEPHONE (OUTPATIENT)
Dept: PHYSICAL THERAPY | Facility: CLINIC | Age: 49
End: 2024-06-03

## 2024-06-03 NOTE — TELEPHONE ENCOUNTER
Caller: Digna Baron    Relationship: Self       What was the call regarding: HAS DENTAL APPT

## 2024-06-07 ENCOUNTER — TREATMENT (OUTPATIENT)
Dept: PHYSICAL THERAPY | Facility: CLINIC | Age: 49
End: 2024-06-07
Payer: COMMERCIAL

## 2024-06-07 DIAGNOSIS — M54.50 CHRONIC LOW BACK PAIN WITHOUT SCIATICA, UNSPECIFIED BACK PAIN LATERALITY: ICD-10-CM

## 2024-06-07 DIAGNOSIS — G89.29 CHRONIC LOW BACK PAIN WITHOUT SCIATICA, UNSPECIFIED BACK PAIN LATERALITY: ICD-10-CM

## 2024-06-07 DIAGNOSIS — R29.3 POOR POSTURE: ICD-10-CM

## 2024-06-07 DIAGNOSIS — G89.29 CHRONIC THORACIC BACK PAIN, UNSPECIFIED BACK PAIN LATERALITY: Primary | ICD-10-CM

## 2024-06-07 DIAGNOSIS — R19.8 ABDOMINAL WEAKNESS: ICD-10-CM

## 2024-06-07 DIAGNOSIS — M54.6 CHRONIC THORACIC BACK PAIN, UNSPECIFIED BACK PAIN LATERALITY: Primary | ICD-10-CM

## 2024-06-07 DIAGNOSIS — M51.34 DDD (DEGENERATIVE DISC DISEASE), THORACIC: ICD-10-CM

## 2024-06-07 NOTE — PROGRESS NOTES
"Progress Note  [facility] [address] [place] [address]      Patient: Digna Baron   : 1975  Diagnosis/ICD-10 Code:  Chronic thoracic back pain, unspecified back pain laterality [M54.6, G89.29]  Referring practitioner: POLA Nguyễn  Date of Initial Visit: Type: THERAPY  Noted: 2024  Today's Date: 2024  Patient seen for 5 sessions      Subjective:   Subjective Questionnaire: Oswestry:  (13.33% disability)  Clinical Progress: improved  Home Program Compliance: Yes  Treatment has included: therapeutic exercise, neuromuscular re-education, manual therapy, and therapeutic activity    Subjective     The burning in her back has been happening for a \"good chunk\" of her life. She thinks it may be because of the spinal block she got when she had her daughter. If she is must standing still or lying flat on her back, her back starts to burn. If she is walking it is okay.    Low back pain on average in the past week has been a 3/10. Pt reports 30-40% improvement in her back pain since starting therapy. Her low back pain is worse after she sits for her 12 hour shifts.      Objective     See Exercise, Manual, and Modality Logs for complete treatment.     Assessment/Plan    Pt tolerated today's session well. Today was reassessment day. Pt has noticed significant improvement in her back pain since starting therapy. She was been able to meet one of her goals today. However, pt continues to have back pain that limits her function, especially low back pain that radites into the hips. Pt's REBECA score has decreased slightly but not significantly since the initial evaluation. Pt seems to have symptoms consistent with core instability, as well. Skilled therapy still required in order to decrease pain and improve function so that she can have increased tolerance to bending, lifting, twisting, prolonged sitting (that she has to do for work), etc. Continue POC.    Goals  Plan Goals: LOW BACK " PROBLEMS:     1. The patient complains of low back pain.  LTG 1: 12 weeks: The patient will report a pain rating of 1/10 or better, on average in the past week, in order to improve tolerance to activities of daily living and improve sleep quality.  STATUS:  progressing   STG 1a: 6 weeks:  The patient will report a pain rating of 2/10 or better, on average in the past week.   STATUS:  progressing     2. The patient complains of low back pain.  LTG 2: 12 weeks: The patient will report 80% improvement in symptoms of her back since starting therapy.   STATUS:  progressing  STG 2a: 6 weeks:The patient will report 20% improvement in symptoms of her back since starting therapy.   STATUS:  MET    3. Mobility: Walking/Moving Around Functional Limitation                   LTG 3: 12 weeks:  The patient will demonstrate a score of 6% disability or less on the REBECA.  STATUS:  progressing   STG 3 a: 6 weeks:  The patient will demonstrate a score of 12% disability or less on the REBECA.  STATUS:  progressing   Progress toward previous goals: Partially Met      Recommendations: Continue as planned  Timeframe: 2 months  Prognosis to achieve goals: good    Signature: Sowmya Rajan PT    Electronically signed 2024    [place] License: PT - [id number]      Timed:  Manual Therapy:    8     mins  68076;  Therapeutic Exercise:    8     mins  93489;     Neuromuscular Ana:    0    mins  52235;    Therapeutic Activity:     15     mins  98283;     Gait Trainin     mins  73814;     Aquatics                         0      mins  16925    Un-timed:  Mechanical Traction      0     mins  49450  Dry Needling     0     mins self-pay  Electrical Stimulation:    0     mins  18824 ( );    Timed Treatment:   31   mins   Total Treatment:     31   mins

## 2024-06-16 DIAGNOSIS — Z85.3 HISTORY OF BREAST CANCER: ICD-10-CM

## 2024-06-17 RX ORDER — TAMOXIFEN CITRATE 10 MG/1
20 TABLET ORAL DAILY
Qty: 180 TABLET | Refills: 1 | Status: SHIPPED | OUTPATIENT
Start: 2024-06-17

## 2024-06-24 RX ORDER — SERTRALINE HYDROCHLORIDE 25 MG/1
75 TABLET, FILM COATED ORAL DAILY
Qty: 270 TABLET | Refills: 0 | Status: SHIPPED | OUTPATIENT
Start: 2024-06-24

## 2024-06-26 ENCOUNTER — TELEPHONE (OUTPATIENT)
Dept: NEUROSURGERY | Facility: CLINIC | Age: 49
End: 2024-06-26
Payer: COMMERCIAL

## 2024-06-26 NOTE — TELEPHONE ENCOUNTER
LM for patient informing to arrive @ tomorrows appt @ 2:15pm and that our new address is [address] [place] [address] OK TO GIVE MESSAGE

## 2024-06-26 NOTE — PROGRESS NOTES
Subjective   Patient ID: Digna Baron is a 49 y.o. female is being seen for consultation today at the request of POLA Nguyễn for thoracic back pain, DDD. Patient had a MRI T-spine on 3/29/2024 @ [facility]    Treatment:Chiropractor, PT    Today patient patient is having mid/low back pain, along with L leg/hip discomfort    History of Present Illness    This patient was having some pain in the upper thoracic spine at the beginning of the year in end of last year.  She had had a history of cancer and ultimately had an MRI done because she was concerned about it.  The pain was never horrible but never kept her from doing anything but it was there all the time.  Since the MRI was done that pain has gotten better but now she has pain across her lower back.  There is no real radiation into her legs and she has no difficulty with bowel bladder control.  She was treated in the upper back with chiropractic and oral medications but they really did not help and in fact the chiropractic made her worse.  She is also done physical therapy.    The following portions of the patient's history were reviewed and updated as appropriate: allergies, current medications, past family history, past medical history, past social history, past surgical history, and problem list.    Review of Systems   Constitutional:  Negative for chills and fever.   HENT:  Negative for congestion.    Genitourinary:  Negative for difficulty urinating and dysuria.   Musculoskeletal:  Positive for back pain and myalgias.   Neurological:  Positive for numbness. Negative for weakness.       I reviewed the review of systems listed by the patient and discussed by my MA    Objective     There were no vitals filed for this visit.  There is no height or weight on file to calculate BMI.    Tobacco Use: Medium Risk (6/27/2024)    Patient History     Smoking Tobacco Use: Former     Smokeless Tobacco Use: Never     Passive Exposure: Not on file           Physical Exam  Constitutional:       Appearance: She is well-developed.   HENT:      Head: Normocephalic and atraumatic.   Eyes:      Extraocular Movements: EOM normal.      Conjunctiva/sclera: Conjunctivae normal.      Pupils: Pupils are equal, round, and reactive to light.   Neck:      Vascular: No carotid bruit.   Neurological:      Mental Status: She is oriented to person, place, and time.      Coordination: Finger-Nose-Finger Test and Heel to Shin Test normal.      Gait: Gait is intact.      Deep Tendon Reflexes:      Reflex Scores:       Tricep reflexes are 2+ on the right side and 2+ on the left side.       Bicep reflexes are 2+ on the right side and 2+ on the left side.       Brachioradialis reflexes are 2+ on the right side and 2+ on the left side.       Patellar reflexes are 2+ on the right side and 2+ on the left side.       Achilles reflexes are 2+ on the right side and 2+ on the left side.  Psychiatric:         Speech: Speech normal.       Neurologic Exam     Mental Status   Oriented to person, place, and time.   Registration of memory: Good recent and remote memory.   Attention: normal. Concentration: normal.   Speech: speech is normal   Level of consciousness: alert  Knowledge: consistent with education.     Cranial Nerves     CN II   Visual fields full to confrontation.   Visual acuity: normal    CN III, IV, VI   Pupils are equal, round, and reactive to light.  Extraocular motions are normal.     CN V   Facial sensation intact.   Right corneal reflex: normal  Left corneal reflex: normal    CN VII   Facial expression full, symmetric.   Right facial weakness: none  Left facial weakness: none    CN VIII   Hearing: intact    CN IX, X   Palate: symmetric    CN XI   Right sternocleidomastoid strength: normal  Left sternocleidomastoid strength: normal    CN XII   Tongue: not atrophic  Tongue deviation: none    Motor Exam   Muscle bulk: normal  Right arm tone: normal  Left arm tone: normal  Right leg  tone: normal  Left leg tone: normal    Strength   Strength 5/5 except as noted.     Sensory Exam   Light touch normal.     Gait, Coordination, and Reflexes     Gait  Gait: normal    Coordination   Finger to nose coordination: normal  Heel to shin coordination: normal    Reflexes   Right brachioradialis: 2+  Left brachioradialis: 2+  Right biceps: 2+  Left biceps: 2+  Right triceps: 2+  Left triceps: 2+  Right patellar: 2+  Left patellar: 2+  Right achilles: 2+  Left achilles: 2+  Right : 2+  Left : 2+          Assessment & Plan   Independent Review of Radiographic Studies:      I personally reviewed the images from the following studies.    I reviewed an MRI of the thoracic spine done on 29 March of this year.  This does show some disc bulging at T6-7, T7-8 and T8-9.  At T6-7 the disc is central into the right side and is distorting the cord a little bit but not causing any cord compression.  At T7-T8 is more on the left similarly.  T8-9 disc is right in the middle and is touching the cord but not really compressing it or distorting it.    Medical Decision Making:      I told the patient about the imaging.  I told her that even if she was still having pain in the upper thoracic spine I would not recommend any surgery as these disc are simply not big enough.  I did also tell her that we should try some physical therapy on her lumbar spine.  Will also get a MRI of her lumbar spine with and without with her history of cancer.    Diagnoses and all orders for this visit:    1. Acute bilateral low back pain without sciatica (Primary)  -     Ambulatory Referral to Physical Therapy  -     MRI Lumbar Spine With & Without Contrast; Future      Return for After radiology test.

## 2024-06-27 ENCOUNTER — PATIENT ROUNDING (BHMG ONLY) (OUTPATIENT)
Dept: NEUROSURGERY | Facility: CLINIC | Age: 49
End: 2024-06-27

## 2024-06-27 ENCOUNTER — OFFICE VISIT (OUTPATIENT)
Dept: NEUROSURGERY | Facility: CLINIC | Age: 49
End: 2024-06-27
Payer: COMMERCIAL

## 2024-06-27 DIAGNOSIS — M54.50 ACUTE BILATERAL LOW BACK PAIN WITHOUT SCIATICA: Primary | ICD-10-CM

## 2024-07-11 ENCOUNTER — OFFICE VISIT (OUTPATIENT)
Dept: ONCOLOGY | Facility: HOSPITAL | Age: 49
End: 2024-07-11
Payer: COMMERCIAL

## 2024-07-11 ENCOUNTER — LAB (OUTPATIENT)
Dept: ONCOLOGY | Facility: HOSPITAL | Age: 49
End: 2024-07-11
Payer: COMMERCIAL

## 2024-07-11 ENCOUNTER — TELEPHONE (OUTPATIENT)
Dept: ONCOLOGY | Facility: HOSPITAL | Age: 49
End: 2024-07-11

## 2024-07-11 VITALS
HEART RATE: 57 BPM | HEIGHT: 65 IN | WEIGHT: 155.6 LBS | OXYGEN SATURATION: 97 % | SYSTOLIC BLOOD PRESSURE: 101 MMHG | BODY MASS INDEX: 25.92 KG/M2 | TEMPERATURE: 96.9 F | DIASTOLIC BLOOD PRESSURE: 64 MMHG | RESPIRATION RATE: 16 BRPM

## 2024-07-11 DIAGNOSIS — C50.512 MALIGNANT NEOPLASM OF LOWER-OUTER QUADRANT OF LEFT BREAST OF FEMALE, ESTROGEN RECEPTOR POSITIVE: Primary | ICD-10-CM

## 2024-07-11 DIAGNOSIS — C50.512 MALIGNANT NEOPLASM OF LOWER-OUTER QUADRANT OF LEFT BREAST OF FEMALE, ESTROGEN RECEPTOR POSITIVE: ICD-10-CM

## 2024-07-11 DIAGNOSIS — Z17.0 MALIGNANT NEOPLASM OF LOWER-OUTER QUADRANT OF LEFT BREAST OF FEMALE, ESTROGEN RECEPTOR POSITIVE: Primary | ICD-10-CM

## 2024-07-11 DIAGNOSIS — Z17.0 MALIGNANT NEOPLASM OF LOWER-OUTER QUADRANT OF LEFT BREAST OF FEMALE, ESTROGEN RECEPTOR POSITIVE: ICD-10-CM

## 2024-07-11 LAB
ALBUMIN SERPL-MCNC: 4.1 G/DL (ref 3.5–5.2)
ALBUMIN/GLOB SERPL: 2.1 G/DL
ALP SERPL-CCNC: 68 U/L (ref 39–117)
ALT SERPL W P-5'-P-CCNC: 42 U/L (ref 1–33)
ANION GAP SERPL CALCULATED.3IONS-SCNC: 2.7 MMOL/L (ref 5–15)
AST SERPL-CCNC: 36 U/L (ref 1–32)
BASOPHILS # BLD AUTO: 0.04 10*3/MM3 (ref 0–0.2)
BASOPHILS NFR BLD AUTO: 0.6 % (ref 0–1.5)
BILIRUB SERPL-MCNC: 0.3 MG/DL (ref 0–1.2)
BUN SERPL-MCNC: 14 MG/DL (ref 6–20)
BUN/CREAT SERPL: 17.9 (ref 7–25)
CALCIUM SPEC-SCNC: 9.3 MG/DL (ref 8.6–10.5)
CHLORIDE SERPL-SCNC: 108 MMOL/L (ref 98–107)
CO2 SERPL-SCNC: 30.3 MMOL/L (ref 22–29)
CREAT SERPL-MCNC: 0.78 MG/DL (ref 0.57–1)
DEPRECATED RDW RBC AUTO: 46.5 FL (ref 37–54)
EGFRCR SERPLBLD CKD-EPI 2021: 93.2 ML/MIN/1.73
EOSINOPHIL # BLD AUTO: 0.15 10*3/MM3 (ref 0–0.4)
EOSINOPHIL NFR BLD AUTO: 2.4 % (ref 0.3–6.2)
ERYTHROCYTE [DISTWIDTH] IN BLOOD BY AUTOMATED COUNT: 13 % (ref 12.3–15.4)
GLOBULIN UR ELPH-MCNC: 2 GM/DL
GLUCOSE SERPL-MCNC: 93 MG/DL (ref 65–99)
HCT VFR BLD AUTO: 40.4 % (ref 34–46.6)
HGB BLD-MCNC: 13 G/DL (ref 12–15.9)
IMM GRANULOCYTES # BLD AUTO: 0.03 10*3/MM3 (ref 0–0.05)
IMM GRANULOCYTES NFR BLD AUTO: 0.5 % (ref 0–0.5)
LYMPHOCYTES # BLD AUTO: 2.18 10*3/MM3 (ref 0.7–3.1)
LYMPHOCYTES NFR BLD AUTO: 35.1 % (ref 19.6–45.3)
MCH RBC QN AUTO: 31 PG (ref 26.6–33)
MCHC RBC AUTO-ENTMCNC: 32.2 G/DL (ref 31.5–35.7)
MCV RBC AUTO: 96.2 FL (ref 79–97)
MONOCYTES # BLD AUTO: 0.38 10*3/MM3 (ref 0.1–0.9)
MONOCYTES NFR BLD AUTO: 6.1 % (ref 5–12)
NEUTROPHILS NFR BLD AUTO: 3.43 10*3/MM3 (ref 1.7–7)
NEUTROPHILS NFR BLD AUTO: 55.3 % (ref 42.7–76)
PLATELET # BLD AUTO: 257 10*3/MM3 (ref 140–450)
PMV BLD AUTO: 9.8 FL (ref 6–12)
POTASSIUM SERPL-SCNC: 4.2 MMOL/L (ref 3.5–5.2)
PROT SERPL-MCNC: 6.1 G/DL (ref 6–8.5)
RBC # BLD AUTO: 4.2 10*6/MM3 (ref 3.77–5.28)
SODIUM SERPL-SCNC: 141 MMOL/L (ref 136–145)
WBC NRBC COR # BLD AUTO: 6.21 10*3/MM3 (ref 3.4–10.8)

## 2024-07-11 PROCEDURE — 85025 COMPLETE CBC W/AUTO DIFF WBC: CPT

## 2024-07-11 PROCEDURE — 80053 COMPREHEN METABOLIC PANEL: CPT

## 2024-07-11 PROCEDURE — 36415 COLL VENOUS BLD VENIPUNCTURE: CPT

## 2024-07-11 PROCEDURE — G0463 HOSPITAL OUTPT CLINIC VISIT: HCPCS | Performed by: INTERNAL MEDICINE

## 2024-07-11 NOTE — TELEPHONE ENCOUNTER
----- Message from Johnathon Ervin sent at 7/11/2024  2:43 PM EDT -----  Regarding: labs  Please let pt know that CBC was normal from today but CMP showed mild elevation of 2 of her liver enzyme tests. Recommend recheck of these labs in the next month and for pt to increase hydration. She can have her PCP do this or she can follow up with Kiara in our office to recheck these labs. I still think it is safe to continue tamoxifen in the meantime and will consider stopping if liver enzyme numbers increase further. Please let me know what pt prefers. Thanks.

## 2024-07-11 NOTE — TELEPHONE ENCOUNTER
Left message for patient, advised that her Complete Blood Count was normal, the Comprehensive Metabolic Panel shows mild elevation of liver enzymes. With the elevated liver enzymes Dr. Ervin does recommend recheck in a month with primary care or with Kiara here at the office and to let us know which she would prefer. Instructed to increase hydration. Advised that it is safe to continue tamoxifen in the meantime. Instructed to maintain all follow up visits and to contact the office with any questions or concerns.

## 2024-07-11 NOTE — PROGRESS NOTES
Chief Complaint/Care Team   Malignant neoplasm of lower-outer quadrant of left breast o    Brenda Ying, AP*  Brenda Ying, APRN    History of Present Illness     Diagnosis: ER+ Left Breast Cancer    Current Treatment: Tamoxifen which began in 6/2019  Previous Treatment:     ER+ Left Breast Cancer:    - diagnosed at age 44  - Bilateral skin sparing mastectomy and left SLNB on 12/21/18: invastive ductal carcinoma of the left breast, grade 2, 12mm focus, negative margins, closest was 4mm, not associated with DCIS, 1 of 2 LN with micrometastatic disease, pT1c pN1mi (stage IA), ER+, SD+, HER2 negative  - treatment with adjuvant chemotherapy with TC but no radiation.   - on Tamoxifen since 2019  -pt transitioned care to Dr. Ervin on 3/5/2024      Digna Baron is a 49 y.o. female who presents to [facility] for ER+ Left Breast Cancer, she is currently receiving treatment with tamoxifen, which she began in 6/2024. she reports tolerating this medication well. She denies any vaginal bleeding. She reports compliance with zoloft and improvement in mood as a result. She was unable to afford BCI testing to determine if she requires longer endocrine therapy due to cost, pt would like to pay down some medical bills prior to paying for this test and is agreeable to continue tamoxifen until BCI testing has been completed.     Review of Systems   Constitutional:  Negative for appetite change, diaphoresis, fatigue, fever, unexpected weight gain and unexpected weight loss.   HENT:  Negative for hearing loss, mouth sores, sore throat, swollen glands, trouble swallowing and voice change.    Eyes:  Negative for blurred vision.   Respiratory:  Negative for cough, shortness of breath and wheezing.    Cardiovascular:  Negative for chest pain and palpitations.   Gastrointestinal:  Negative for abdominal pain, blood in stool, constipation, diarrhea, nausea and vomiting.   Endocrine:  "Negative for cold intolerance and heat intolerance.   Genitourinary:  Negative for difficulty urinating, dysuria, frequency, hematuria and urinary incontinence.   Musculoskeletal:  Negative for arthralgias, back pain and myalgias.   Skin:  Negative for rash, skin lesions and wound.   Neurological:  Negative for dizziness, seizures, weakness, numbness and headache.   Hematological:  Does not bruise/bleed easily.   Psychiatric/Behavioral:  Negative for depressed mood. The patient is not nervous/anxious.    All other systems reviewed and are negative.       Oncology/Hematology History Overview Note     ER+ Left Breast Cancer:    - diagnosed at age 44  - Bilateral skin sparing mastectomy and left SLNB on 12/21/18: invastive ductal carcinoma of the left breast, grade 2, 12mm focus, negative margins, closest was 4mm, not associated with DCIS, 1 of 2 LN with micrometastatic disease, pT1c pN1mi (stage IA), ER+, WA+, HER2 negative  - treatment with adjuvant chemotherapy with TC but no radiation.   - on Tamoxifen since 2019     Malignant neoplasm metastatic to lymph nodes   12/28/2018 Initial Diagnosis    Malignant neoplasm metastatic to lymph nodes (HCC)     Malignant neoplasm of lower-outer quadrant of left female breast   12/6/2018 Initial Diagnosis    Malignant neoplasm of lower-outer quadrant of left female breast (HCC)         Objective     Vitals:    07/11/24 0849   BP: 101/64   Pulse: 57   Resp: 16   Temp: 96.9 °F (36.1 °C)   TempSrc: Temporal   SpO2: 97%   Weight: 70.6 kg (155 lb 9.6 oz)   Height: 165.1 cm (65\")   PainSc: 0-No pain       ECOG score: 0         PHQ-9 Total Score:         Physical Exam  Vitals reviewed. Exam conducted with a chaperone present.   Constitutional:       General: She is not in acute distress.     Appearance: Normal appearance.   HENT:      Head: Normocephalic and atraumatic.   Eyes:      Extraocular Movements: Extraocular movements intact.      Conjunctiva/sclera: Conjunctivae normal. "   Pulmonary:      Effort: Pulmonary effort is normal.   Musculoskeletal:      Cervical back: Normal range of motion and neck supple.   Skin:     General: Skin is warm and dry.      Findings: No bruising.   Neurological:      Mental Status: She is oriented to person, place, and time.           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      celecoxib (CeleBREX) 100 MG capsule Take 1 capsule by mouth 2 (Two) Times a Day. 30 capsule 0    Ferrous Sulfate Dried (FERROUS SULFATE IRON PO) Take  by mouth.      fluticasone (FLONASE) 50 MCG/ACT nasal spray 2 sprays into the nostril(s) as directed by provider Daily. 18.2 mL 3    methocarbamol (ROBAXIN) 750 MG tablet Take 1 tablet by mouth 2 (Two) Times a Day As Needed for Muscle Spasms. 30 tablet 0    Multiple Vitamins-Minerals (ZINC PO) Take  by mouth.      multivitamin with minerals tablet tablet Take 1 tablet by mouth Daily.      Omega-3 Fatty Acids (fish oil) 1000 MG capsule capsule Take  by mouth Daily With Breakfast.      sertraline (ZOLOFT) 25 MG tablet TAKE 3 TABLETS BY MOUTH EVERY  tablet 0    tamoxifen (NOLVADEX) 10 MG tablet Take 2 tablets by mouth Daily. 180 tablet 1    topiramate (Topamax) 25 MG tablet 1 tab PO BID 60 tablet 5    vitamin B-12 (CYANOCOBALAMIN) 1000 MCG tablet Take 1 tablet by mouth Daily.      vitamin C (ASCORBIC ACID) 250 MG tablet Take 1 tablet by mouth Daily.      Zinc Acetate, Oral, (ZINC ACETATE PO) Take  by mouth.       No current facility-administered medications on file prior to visit.      No Known Allergies  Past Surgical History:   Procedure Laterality Date    BREAST SURGERY  12/21/2018     Bilateral Mastectomy and implant exchange in May 2019     SECTION  1996    Daughter's Birth    COLONOSCOPY N/A 2023    Procedure: COLONOSCOPY;  Surgeon: Tylor Bahena MD;  Location: [facility] ENDOSCOPY;  Service: General;  Laterality: N/A;  normal colonoscopy    LYMPH NODE BIOPSY  2018    Breast cancer    US GUIDED LYMPH NODE BIOPSY  8/10/2023     Social History     Socioeconomic History    Marital status:    Tobacco Use    Smoking status: Former     Current packs/day: 0.00     Average packs/day: 1 pack/day for 20.2 years (20.2 ttl pk-yrs)     Types: Cigarettes     Start date: 1992     Quit date: 2012     Years since quittin.9    Smokeless tobacco: Never   Vaping Use    Vaping status: Never Used   Substance and Sexual Activity    Alcohol use: Not Currently     Comment: Heavy    Drug use: Yes     Types: Hashish, Marijuana, Methamphetamines     Comment: 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 He's been clean most of my life    Cancer Maternal Grandfather         Bladder cancer    Diabetes Maternal Grandfather         Extreme kidney failure. One at 0% function    Heart disease Maternal Grandfather         Heart attack when he was 50    Kidney disease Maternal Grandfather     Cancer Paternal Grandmother         Breast cancer    Cancer Paternal Aunt         Gall bladder cancer    Early death Paternal Aunt         Passed from the cancer at 39 yo    Thyroid disease Daughter         Hypothyroidism       Results     Result Review   The following data was reviewed by: Johnathon Ervin MD on 2024:  Lab Results   Component Value Date    HGB 13.0 2024    HCT 40.4 2024    MCV 96.2 2024     2024    WBC 6.21 2024    NEUTROABS 3.43 2024    LYMPHSABS  2.18 07/11/2024    MONOSABS 0.38 07/11/2024    EOSABS 0.15 07/11/2024    BASOSABS 0.04 07/11/2024     Lab Results   Component Value Date    GLUCOSE 93 07/11/2024    BUN 14 07/11/2024    CREATININE 0.78 07/11/2024     07/11/2024    K 4.2 07/11/2024     (H) 07/11/2024    CO2 30.3 (H) 07/11/2024    CALCIUM 9.3 07/11/2024    PROTEINTOT 6.1 07/11/2024    ALBUMIN 4.1 07/11/2024    BILITOT 0.3 07/11/2024    ALKPHOS 68 07/11/2024    AST 36 (H) 07/11/2024    ALT 42 (H) 07/11/2024     Lab Results   Component Value Date    FREET4 1.07 10/02/2023    TSH 2.960 10/02/2023           No radiology results for the last day       Assessment & Plan     Diagnoses and all orders for this visit:    1. Malignant neoplasm of lower-outer quadrant of left breast of female, estrogen receptor positive (Primary)  -     CBC & Differential; Future  -     Comprehensive Metabolic Panel; Future  -     Comprehensive Metabolic Panel; Future  -     CBC & Differential; Future          Digna Baron is a 49 y.o. female who presents to [facility] for ER+ Left Breast Cancer, she is currently receiving treatment with tamoxifen,    -discussed results of most recent US of breast which was negative from 3/1/2024, no breast concerns reported today  -pt is s/p mastectomy   -discussed plan to obtain BCI to determine if she would benefit from 10 years of endocrine therapy, pt was unable to pay for this test when it was ordered earlier this year and was ineligible for assistance programs, she would like to pay down medical bills prior to me reordering BCI  -overall pt tolerating tamoxifen well, recommend continuing for now, provided refill today  -ordered recheck of labs with CBC and CMP will call pt once these labs return.    Back pain  -pt underwent MRI spine ordered by PCP and I ordered NM bone scan in 3/2024 to assess for disease recurrence, both were negative for metastatic disease in 3/2024    -plan for pt  follow up in 6 months to discuss BCI results (if back) with labs CBC, CMP.    Please note that portions of this note were completed with a voice recognition program.    Electronically signed by Johnathon Ervin MD, 07/11/24, 2:46 PM EDT.      Follow Up     I spent 30 minutes caring for Digna on this date of service. This time includes time spent by me in the following activities:preparing for the visit, reviewing tests, obtaining and/or reviewing a separately obtained history, performing a medically appropriate examination and/or evaluation , counseling and educating the patient/family/caregiver, ordering medications, tests, or procedures, referring and communicating with other health care professionals , documenting information in the medical record, independently interpreting results and communicating that information with the patient/family/caregiver, and care coordination.    This is an acute or chronic illness that poses a threat to life or bodily function. The above treatment plan involves a high risk of complications and/or mortality of patient management.    The patient was seen and examined. Work by the provider also included review and/or ordering of lab tests, review and/or ordering of radiology tests, review and/or ordering of medicine tests, discussion with other physicians or providers, independent review of data, obtaining old records, review/summation of old records, and/or other review.    I have reviewed the family history, social history, and past medical history for this patient. Previous information and data has been reviewed and updated as needed. I have reviewed and verified the chief complaint, history, and other documentation. The patient was interviewed and examined in the clinic and the chart reviewed. The previous observations, recommendations, and conclusions were reviewed including those of other providers.     The plan was discussed with the patient and/or family. The patient was given  time to ask questions and these questions were answered. At the conclusion of their visit they had no additional questions or concerns and all questions were answered to their satisfaction.    Patient was given instructions and counseling regarding her condition or for health maintenance advice. Please see specific information pulled into the AVS if appropriate.

## 2024-07-29 ENCOUNTER — TREATMENT (OUTPATIENT)
Dept: PHYSICAL THERAPY | Facility: CLINIC | Age: 49
End: 2024-07-29
Payer: COMMERCIAL

## 2024-07-29 DIAGNOSIS — G89.29 CHRONIC BILATERAL THORACIC BACK PAIN: ICD-10-CM

## 2024-07-29 DIAGNOSIS — M51.34 DDD (DEGENERATIVE DISC DISEASE), THORACIC: ICD-10-CM

## 2024-07-29 DIAGNOSIS — G89.29 CHRONIC MIDLINE LOW BACK PAIN WITHOUT SCIATICA: Primary | ICD-10-CM

## 2024-07-29 DIAGNOSIS — R29.3 POOR POSTURE: ICD-10-CM

## 2024-07-29 DIAGNOSIS — M54.50 CHRONIC MIDLINE LOW BACK PAIN WITHOUT SCIATICA: Primary | ICD-10-CM

## 2024-07-29 DIAGNOSIS — M54.6 CHRONIC BILATERAL THORACIC BACK PAIN: ICD-10-CM

## 2024-07-29 DIAGNOSIS — R19.8 ABDOMINAL WEAKNESS: ICD-10-CM

## 2024-07-29 PROCEDURE — 97140 MANUAL THERAPY 1/> REGIONS: CPT | Performed by: PHYSICAL THERAPIST

## 2024-07-29 PROCEDURE — 97110 THERAPEUTIC EXERCISES: CPT | Performed by: PHYSICAL THERAPIST

## 2024-07-29 PROCEDURE — 97164 PT RE-EVAL EST PLAN CARE: CPT | Performed by: PHYSICAL THERAPIST

## 2024-07-29 NOTE — PROGRESS NOTES
Re-Assessment / Re-Certification  [address] [place] [id number]      Patient: Digna Baron   : 1975  Diagnosis/ICD-10 Code:  Chronic midline low back pain without sciatica [M54.50, G89.29]  Referring practitioner: Solitario Rose MD  Date of Initial Visit: Type: THERAPY  Noted: 2024  Today's Date: 2024  Patient seen for 6 sessions      Subjective:   Subjective Questionnaire: Oswestry: 18% limitation  Clinical Progress: improved  Home Program Compliance: Yes  Treatment has included: therapeutic exercise, manual therapy, and therapeutic activity    Subjective Pt feels like the pain has moved down to her lower back more so than her mid back. She is still having trouble with standing or sitting for longer periods time. She does feel like her heating pad while sitting has been helpful. She has done some hip flexor stretching that helps and some yoga poses.     Current Pain 1/10  Best Pain: 1/10  Worst Pain: 5/10  Quality of pain: aching       Objective     Tenderness      Additional Tenderness Details  Thoracic/lumbar CPA's mildly hypomobile     Active Range of Motion   Lumbar  Flexion: WNL  Extension: mildly painful, limited 25%  SB/Rotation: WNL     Strength/Myotome Testing      Left Shoulder      Planes of Motion   Flexion: 5   Abduction: 5      Right Shoulder      Planes of Motion   Flexion: 5   Abduction: 5      Left Hip   Planes of Motion   Flexion: 5     Right Hip   Planes of Motion   Flexion: 5     Left Knee   Flexion: 5  Extension: 5     Right Knee   Flexion: 5  Extension: 5     Left Ankle/Foot   Dorsiflexion: 5     Right Ankle/Foot   Dorsiflexion: 5       See Exercise, Manual, and Modality Logs for complete treatment.     Assessment/Plan    Pt tolerated today's session well. Updated re-evaluation for low back pain script from her physician. She did well with mobility and abdominal strengthening routine today. She is having less pain and problems in her thoracic spine, so focusing  on the lumbar spine at this time. Skilled therapy still required in order to decrease pain and improve function so that she can have increased tolerance to bending, lifting, twisting, prolonged sitting (that she has to do for work), etc. Continue POC.     Goals  Plan Goals: LOW BACK PROBLEMS:     1. The patient complains of low back pain.  LTG 1: 12 weeks: The patient will report a pain rating of 1/10 or better, on average in the past week, in order to improve tolerance to activities of daily living and improve sleep quality.  STATUS:  progressing   STG 1a: 6 weeks:  The patient will report a pain rating of 2/10 or better, on average in the past week.   STATUS:  progressing     2. The patient complains of low back pain.  LTG 2: 12 weeks: The patient will report 80% improvement in symptoms of her back since starting therapy.   STATUS:  progressing  STG 2a: 6 weeks:The patient will report 20% improvement in symptoms of her back since starting therapy.   STATUS:  MET     3. Mobility: Walking/Moving Around Functional Limitation                   LTG 3: 12 weeks:  The patient will demonstrate a score of 6% disability or less on the REBECA.  STATUS:  progressing   STG 3 a: 6 weeks:  The patient will demonstrate a score of 12% disability or less on the REBECA.  STATUS:  progressing   Progress toward previous goals: Partially Met                Recommendations: Continue as planned  Timeframe: 2 months  Prognosis to achieve goals: good      Progress toward previous goals: Partially Met        Recommendations: Continue as planned  Timeframe: 3 months  Prognosis to achieve goals: good    PT Signature: Dandy Geronimo PT    Electronically signed 7/30/2024    [place] License: PT - [id number]     Based upon review of the patient's progress and continued therapy plan, it is my medical opinion that Digna Baron should continue physical therapy treatment at [facility]  [address]  [place]  [address]  [phone].    Signature: __________________________________  Solitario Rose MD  NPI:        90 Day Recertification  Certification Period: 2024 thru 10/27/2024  I certify that the therapy services are furnished while this patient is under my care.  The services outlined above are required by this patient, and will be reviewed every 90 days.      Please sign and return via fax to [phone]. Thank you, [facility].    Timed:  Manual Therapy:    8     mins  20602;  Therapeutic Exercise:    14     mins  33835;     Neuromuscular Ana:    0    mins  85440;    Therapeutic Activity:     0     mins  75468;     Gait Trainin     mins  02254;     Ultrasound:     0     mins  50349;    Aquatic Therapy    0     mins  34008    Untimed:  Electrical Stimulation:    0     mins  08721 ( );  Dry Needling     0     mins self-pay  Canalith Repos    0     mins [id number]  Moist Heat     0     mins No charge  Re-Eval                           10    mins  40071    Timed Treatment:   22   mins   Total Treatment:     32   mins

## 2024-08-16 ENCOUNTER — TREATMENT (OUTPATIENT)
Dept: PHYSICAL THERAPY | Facility: CLINIC | Age: 49
End: 2024-08-16
Payer: COMMERCIAL

## 2024-08-16 DIAGNOSIS — R29.3 POOR POSTURE: ICD-10-CM

## 2024-08-16 DIAGNOSIS — G89.29 CHRONIC BILATERAL THORACIC BACK PAIN: ICD-10-CM

## 2024-08-16 DIAGNOSIS — M51.34 DDD (DEGENERATIVE DISC DISEASE), THORACIC: ICD-10-CM

## 2024-08-16 DIAGNOSIS — M54.6 CHRONIC BILATERAL THORACIC BACK PAIN: ICD-10-CM

## 2024-08-16 DIAGNOSIS — R19.8 ABDOMINAL WEAKNESS: ICD-10-CM

## 2024-08-16 DIAGNOSIS — M54.50 CHRONIC MIDLINE LOW BACK PAIN WITHOUT SCIATICA: Primary | ICD-10-CM

## 2024-08-16 DIAGNOSIS — G89.29 CHRONIC MIDLINE LOW BACK PAIN WITHOUT SCIATICA: Primary | ICD-10-CM

## 2024-08-16 NOTE — PROGRESS NOTES
Physical Therapy Daily Treatment Note      Patient: Digna Baron   : 1975  Referring practitioner: Solitario Rose MD  Date of Initial Visit: Type: THERAPY  Noted: 2024  Today's Date: 2024  Patient seen for 7 sessions           Subjective Questionnaire:       Subjective Evaluation    History of Present Illness    Subjective comment: Pt reports 5/10 low back pain.       Objective   See Exercise, Manual, and Modality Logs for complete treatment.       Assessment & Plan       Assessment  Assessment details: May upgrade strengthening next visit as pt stated today's exercises were OK, not to easy.  Pt reported her back felt good after session.  Continue with POC.          Visit Diagnoses:    ICD-10-CM ICD-9-CM   1. Chronic midline low back pain without sciatica  M54.50 724.2    G89.29 338.29   2. Chronic bilateral thoracic back pain  M54.6 724.1    G89.29 338.29   3. DDD (degenerative disc disease), thoracic  M51.34 722.51   4. Abdominal weakness  R19.8 789.9   5. Poor posture  R29.3 781.92       Progress per Plan of Care and Progress strengthening /stabilization /functional activity           Timed:  Manual Therapy:    8     mins  50361;  Therapeutic Exercise:    12     mins  28448;     Neuromuscular Ana:        mins  26000;    Therapeutic Activity:     8     mins  88250;     Gait Training:           mins  00997;     Ultrasound:          mins  14732;    Electrical Stimulation:         mins  33330 ( );  Aquatic Therapy          mins  52251    Untimed:  Electrical Stimulation:         mins  76465 ( );  Mechanical Traction:         mins  47456;     Timed Treatment:   28   mins   Total Treatment:     28   mins    Electronically signed    Jasmine Rascon PTA  Physical Therapist Assistant    [place] license: [id number]

## 2024-08-26 ENCOUNTER — TREATMENT (OUTPATIENT)
Dept: PHYSICAL THERAPY | Facility: CLINIC | Age: 49
End: 2024-08-26
Payer: COMMERCIAL

## 2024-08-26 DIAGNOSIS — M54.50 CHRONIC MIDLINE LOW BACK PAIN WITHOUT SCIATICA: Primary | ICD-10-CM

## 2024-08-26 DIAGNOSIS — R19.8 ABDOMINAL WEAKNESS: ICD-10-CM

## 2024-08-26 DIAGNOSIS — M54.6 CHRONIC BILATERAL THORACIC BACK PAIN: ICD-10-CM

## 2024-08-26 DIAGNOSIS — M51.34 DDD (DEGENERATIVE DISC DISEASE), THORACIC: ICD-10-CM

## 2024-08-26 DIAGNOSIS — G89.29 CHRONIC MIDLINE LOW BACK PAIN WITHOUT SCIATICA: Primary | ICD-10-CM

## 2024-08-26 DIAGNOSIS — G89.29 CHRONIC BILATERAL THORACIC BACK PAIN: ICD-10-CM

## 2024-08-26 DIAGNOSIS — R29.3 POOR POSTURE: ICD-10-CM

## 2024-08-26 NOTE — PROGRESS NOTES
"Physical Therapy Daily Treatment Note  [address] [place] [address]    Patient: Digna Baron   : 1975  Referring practitioner: Solitario Rose MD  Date of Initial Visit: Type: THERAPY  Noted: 2024  Today's Date: 2024  Patient seen for 8 sessions           Subjective  Digna Baron reports:  she is feeling her low back today, rating it 3/10.     Objective   See Exercise, Manual, and Modality Logs for complete treatment.     Assessment/Plan  Digna tolerated advancement in resistance and reps during session today. She advised her back felt better after session, \"it usually does.\" She spoke about her workouts at home. She was encouraged to schedule another visit that will be a PN.    Visit Diagnoses:    ICD-10-CM ICD-9-CM   1. Chronic midline low back pain without sciatica  M54.50 724.2    G89.29 338.29   2. Chronic bilateral thoracic back pain  M54.6 724.1    G89.29 338.29   3. DDD (degenerative disc disease), thoracic  M51.34 722.51   4. Abdominal weakness  R19.8 789.9   5. Poor posture  R29.3 781.92       Progress per Plan of Care and Progress strengthening /stabilization /functional activity         Timed:  Manual Therapy:         mins  93032;  Therapeutic Exercise:    10     mins  64562;     Neuromuscular Ana:    11    mins  25115;    Therapeutic Activity:     10     mins  54221;     Gait Training:           mins  96456;     Ultrasound:          mins  34942;    Electrical Stimulation:         mins  44414 ( );  Aquatics  __   mins   88468    Untimed:  Electrical Stimulation:         mins  20264 ( );  Mechanical Traction:         mins  37688;     Timed Treatment:   31   mins   Total Treatment:     31   mins    Electronically Signed:  Carrie Braun PTA  Physical Therapist Assistant    [place] PTA license [id number]            "

## 2024-09-04 ENCOUNTER — TREATMENT (OUTPATIENT)
Dept: PHYSICAL THERAPY | Facility: CLINIC | Age: 49
End: 2024-09-04
Payer: COMMERCIAL

## 2024-09-04 DIAGNOSIS — G89.29 CHRONIC BILATERAL THORACIC BACK PAIN: ICD-10-CM

## 2024-09-04 DIAGNOSIS — R29.3 POOR POSTURE: ICD-10-CM

## 2024-09-04 DIAGNOSIS — M54.50 CHRONIC MIDLINE LOW BACK PAIN WITHOUT SCIATICA: Primary | ICD-10-CM

## 2024-09-04 DIAGNOSIS — R19.8 ABDOMINAL WEAKNESS: ICD-10-CM

## 2024-09-04 DIAGNOSIS — M54.6 CHRONIC BILATERAL THORACIC BACK PAIN: ICD-10-CM

## 2024-09-04 DIAGNOSIS — M51.34 DDD (DEGENERATIVE DISC DISEASE), THORACIC: ICD-10-CM

## 2024-09-04 DIAGNOSIS — G89.29 CHRONIC MIDLINE LOW BACK PAIN WITHOUT SCIATICA: Primary | ICD-10-CM

## 2024-09-04 NOTE — PROGRESS NOTES
Progress Assessment  [address] [place] [id number]        Patient: Digna Baron   : 1975  Diagnosis/ICD-10 Code:  Chronic midline low back pain without sciatica [M54.50, G89.29]  Referring practitioner: Solitario Rose MD  Date of Initial Visit: Type: THERAPY  Noted: 2024  Today's Date: 2024  Patient seen for 9 sessions      Subjective:     Subjective Questionnaire: Oswestry:  = 40% limitation  Clinical Progress: improved  Home Program Compliance: Yes  Treatment has included: therapeutic exercise, neuromuscular re-education, manual therapy, and therapeutic activity    Subjective : Digna Baron reports: she had been doing very well with her back until over the weekend when she started having more pain and spasm after being on her riding . She had excruciating pain and was unable to do much exercise or stretching. She has slower returned over the last two days, but still having some problems.    Current Pain 4/10      Objective :   Tenderness      Additional Tenderness Details  Lumbar CPA's hypomobile throughout, left paraspinals more guarded, left gluteus medius tender to palpation.     Active Range of Motion   Lumbar  Flexion: WNL  Extension: mildly painful, limited 25%  SB/Rotation: WNL     Strength/Myotome Testing      Left Shoulder      Planes of Motion   Flexion: 5   Abduction: 5      Right Shoulder      Planes of Motion   Flexion: 5   Abduction: 5      Left Hip   Planes of Motion   Flexion: 5     Right Hip   Planes of Motion   Flexion: 5     Left Knee   Flexion: 5  Extension: 5     Right Knee   Flexion: 5  Extension: 5     Left Ankle/Foot   Dorsiflexion: 5     Right Ankle/Foot   Dorsiflexion: 5        Assessment/Plan    Pt tolerated today's session well. She had been doing well with treatment for her lumbar spine until this past weekend. Today, she is more tender and stiff, limited in her functional mobility. She is expected to return to prior levels of pain with  therapy and easing back into her HEP as pain allows. Pt reported less pain leaving the clinic today. Skilled therapy still required in order to decrease pain and improve function so that she can have increased tolerance to bending, lifting, twisting, prolonged sitting (that she has to do for work), etc. Continue POC.     Goals  Plan Goals: LOW BACK PROBLEMS:     1. The patient complains of low back pain.  LTG 1: 12 weeks: The patient will report a pain rating of 1/10 or better, on average in the past week, in order to improve tolerance to activities of daily living and improve sleep quality.  STATUS:  progressing   STG 1a: 6 weeks:  The patient will report a pain rating of 2/10 or better, on average in the past week.   STATUS:  progressing     2. The patient complains of low back pain.  LTG 2: 12 weeks: The patient will report 80% improvement in symptoms of her back since starting therapy.   STATUS:  progressing  STG 2a: 6 weeks:The patient will report 20% improvement in symptoms of her back since starting therapy.   STATUS:  MET     3. Mobility: Walking/Moving Around Functional Limitation                   LTG 3: 12 weeks:  The patient will demonstrate a score of 6% disability or less on the REBECA.  STATUS:  progressing   STG 3 a: 6 weeks:  The patient will demonstrate a score of 12% disability or less on the REBECA.  STATUS:  progressing   Progress toward previous goals: Partially Met      Progress toward previous goals: Partially Met    See Exercise, Manual, and Modality Logs for complete treatment.         Recommendations: Continue as planned  Timeframe: 2 months  Prognosis to achieve goals: good    PT Signature: Dandy Geronimo PT    Electronically signed 9/4/2024    [place] License: PT - [id number]     Based upon review of the patient's progress and continued therapy plan, it is my medical opinion that Digna Baron should continue physical therapy treatment at [facility]  [address]  [place] [address]  [phone].      Timed:         Manual Therapy:    10     mins  69056;     Therapeutic Exercise:    15     mins  05654;     Neuromuscular Ana:    0    mins  38665;    Therapeutic Activity:     0     mins  97096;     Gait Trainin     mins  62911;     Ultrasound:     0     mins  17268;    Self Care                       0     mins   56420  Aquatic                          0     mins [id number]          Timed Treatment:   25   mins   Total Treatment:     25   mins      I certify that the therapy services are furnished while this patient is under my care.  The services outlined above are required by this patient, and will be reviewed every 90 days.

## 2024-09-09 ENCOUNTER — TREATMENT (OUTPATIENT)
Dept: PHYSICAL THERAPY | Facility: CLINIC | Age: 49
End: 2024-09-09
Payer: COMMERCIAL

## 2024-09-09 ENCOUNTER — OFFICE VISIT (OUTPATIENT)
Dept: FAMILY MEDICINE CLINIC | Facility: CLINIC | Age: 49
End: 2024-09-09
Payer: COMMERCIAL

## 2024-09-09 VITALS
WEIGHT: 167 LBS | HEART RATE: 66 BPM | OXYGEN SATURATION: 98 % | HEIGHT: 65 IN | DIASTOLIC BLOOD PRESSURE: 51 MMHG | SYSTOLIC BLOOD PRESSURE: 105 MMHG | BODY MASS INDEX: 27.82 KG/M2

## 2024-09-09 DIAGNOSIS — R29.3 POOR POSTURE: ICD-10-CM

## 2024-09-09 DIAGNOSIS — G43.809 OTHER MIGRAINE WITHOUT STATUS MIGRAINOSUS, NOT INTRACTABLE: ICD-10-CM

## 2024-09-09 DIAGNOSIS — F41.9 ANXIETY AND DEPRESSION: Primary | ICD-10-CM

## 2024-09-09 DIAGNOSIS — Z12.4 CERVICAL CANCER SCREENING: ICD-10-CM

## 2024-09-09 DIAGNOSIS — M51.34 DDD (DEGENERATIVE DISC DISEASE), THORACIC: ICD-10-CM

## 2024-09-09 DIAGNOSIS — G89.29 CHRONIC BILATERAL THORACIC BACK PAIN: ICD-10-CM

## 2024-09-09 DIAGNOSIS — M54.6 CHRONIC BILATERAL THORACIC BACK PAIN: ICD-10-CM

## 2024-09-09 DIAGNOSIS — M54.50 CHRONIC MIDLINE LOW BACK PAIN WITHOUT SCIATICA: Primary | ICD-10-CM

## 2024-09-09 DIAGNOSIS — G89.29 CHRONIC MIDLINE LOW BACK PAIN WITHOUT SCIATICA: Primary | ICD-10-CM

## 2024-09-09 DIAGNOSIS — F32.A ANXIETY AND DEPRESSION: Primary | ICD-10-CM

## 2024-09-09 DIAGNOSIS — R19.8 ABDOMINAL WEAKNESS: ICD-10-CM

## 2024-09-09 PROBLEM — G43.909 MIGRAINE: Status: ACTIVE | Noted: 2024-09-09

## 2024-09-09 PROCEDURE — 97110 THERAPEUTIC EXERCISES: CPT | Performed by: PHYSICAL THERAPIST

## 2024-09-09 PROCEDURE — 97530 THERAPEUTIC ACTIVITIES: CPT | Performed by: PHYSICAL THERAPIST

## 2024-09-09 PROCEDURE — 97112 NEUROMUSCULAR REEDUCATION: CPT | Performed by: PHYSICAL THERAPIST

## 2024-09-09 PROCEDURE — 99214 OFFICE O/P EST MOD 30 MIN: CPT | Performed by: NURSE PRACTITIONER

## 2024-09-09 RX ORDER — SUMATRIPTAN 50 MG/1
TABLET, FILM COATED ORAL
Qty: 9 TABLET | Refills: 2 | Status: SHIPPED | OUTPATIENT
Start: 2024-09-09

## 2024-09-09 RX ORDER — SERTRALINE HYDROCHLORIDE 25 MG/1
75 TABLET, FILM COATED ORAL DAILY
Qty: 270 TABLET | Refills: 1 | Status: SHIPPED | OUTPATIENT
Start: 2024-09-09

## 2024-09-09 NOTE — PROGRESS NOTES
Chief Complaint  Anxiety, Depression, and Migraine    SUBJECTIVE  Digna Baron presents to [facility] for six month follow up on Anxiety and Depression. Pt would like to discuss migraines and possibly getting a script for Imitrex.     Patient states has had headaches for many many years, states does not have them very often, maybe once a month, but when they do occur they tend to last a day or 2 before they completely resolve, and nothing over-the-counter has ever helped resolve the pain.  Patient reports that she never considered it to be a migraine because she always thought it did not qualify with a migraine if you are able to get out of bed and she was able to.  States that she was with a friend recently and had one of the headaches, her friend told her it sounded like a migraine and gave her one of her Imitrex which relieved the headache very quickly.  Patient states nothing has ever relieved the headaches for her prior to this medication.  Patient states that her headaches have not changed, they are not any more frequent or any worse.  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 He's been clean most of my life    Cancer Maternal Grandfather         Bladder cancer    Diabetes Maternal Grandfather         Extreme kidney failure. One at 0% function    Heart disease Maternal Grandfather         Heart attack when he was 50    Kidney disease Maternal Grandfather     Cancer Paternal Grandmother         Breast  "cancer    Cancer Paternal Aunt         Gall bladder cancer    Early death Paternal Aunt         Passed from the cancer at 39 yo    Thyroid disease Daughter         Hypothyroidism      Past Surgical History:   Procedure Laterality Date    BREAST SURGERY  2018    Bilateral Mastectomy and implant exchange in May 2019     SECTION  1996    Daughter's Birth    COLONOSCOPY N/A 2023    Procedure: COLONOSCOPY;  Surgeon: Tylor Bahena MD;  Location: [facility];  Service: General;  Laterality: N/A;  normal colonoscopy    LYMPH NODE BIOPSY  2018    Breast cancer    US GUIDED LYMPH NODE BIOPSY  8/10/2023        Current Outpatient Medications:     Ferrous Sulfate Dried (FERROUS SULFATE IRON PO), Take  by mouth., Disp: , Rfl:     fluticasone (FLONASE) 50 MCG/ACT nasal spray, 2 sprays into the nostril(s) as directed by provider Daily., Disp: 18.2 mL, Rfl: 3    multivitamin with minerals tablet tablet, Take 1 tablet by mouth Daily., Disp: , Rfl:     Omega-3 Fatty Acids (fish oil) 1000 MG capsule capsule, Take  by mouth Daily With Breakfast., Disp: , Rfl:     sertraline (ZOLOFT) 25 MG tablet, Take 3 tablets by mouth Daily., Disp: 270 tablet, Rfl: 1    vitamin B-12 (CYANOCOBALAMIN) 1000 MCG tablet, Take 1 tablet by mouth Daily., Disp: , Rfl:     vitamin C (ASCORBIC ACID) 250 MG tablet, Take 1 tablet by mouth Daily., Disp: , Rfl:     Zinc Acetate, Oral, (ZINC ACETATE PO), Take  by mouth., Disp: , Rfl:     SUMAtriptan (Imitrex) 50 MG tablet, Take one tablet at onset of headache. May repeat dose one time in 2 hours if headache not relieved., Disp: 9 tablet, Rfl: 2    tamoxifen (NOLVADEX) 10 MG tablet, Take 2 tablets by mouth Daily., Disp: 180 tablet, Rfl: 1    OBJECTIVE  Vital Signs:   /51   Pulse 66   Ht 165.1 cm (65\")   Wt 75.8 kg (167 lb)   SpO2 98%   BMI 27.79 kg/m²    Estimated body mass index is 27.79 kg/m² as calculated from the following:    Height as of this encounter: 165.1 cm " "(65\").    Weight as of this encounter: 75.8 kg (167 lb).     Wt Readings from Last 3 Encounters:   09/09/24 75.8 kg (167 lb)   07/11/24 70.6 kg (155 lb 9.6 oz)   03/18/24 73.8 kg (162 lb 12.8 oz)     BP Readings from Last 3 Encounters:   09/09/24 105/51   07/11/24 101/64   03/18/24 119/79       Physical Exam  Vitals reviewed.   Constitutional:       Appearance: Normal appearance. She is well-developed.   HENT:      Head: Normocephalic and atraumatic.      Right Ear: External ear normal.      Left Ear: External ear normal.   Eyes:      Conjunctiva/sclera: Conjunctivae normal.      Pupils: Pupils are equal, round, and reactive to light.   Cardiovascular:      Rate and Rhythm: Normal rate and regular rhythm.      Heart sounds: No murmur heard.     No friction rub. No gallop.   Pulmonary:      Effort: Pulmonary effort is normal.      Breath sounds: Normal breath sounds. No wheezing or rhonchi.   Skin:     General: Skin is warm and dry.   Neurological:      Mental Status: She is alert and oriented to person, place, and time.      Cranial Nerves: No cranial nerve deficit.   Psychiatric:         Mood and Affect: Mood and affect normal.         Behavior: Behavior normal.         Thought Content: Thought content normal.         Judgment: Judgment normal.          Result Review    CMP          10/2/2023    13:19 7/11/2024    09:56   CMP   Glucose 98  93    BUN 11  14    Creatinine 0.93  0.78    EGFR 76.0  93.2    Sodium 141  141    Potassium 3.9  4.2    Chloride 109  108    Calcium 9.2  9.3    Total Protein 6.4  6.1    Albumin 4.0  4.1    Globulin 2.4  2.0    Total Bilirubin 0.2  0.3    Alkaline Phosphatase 65  68    AST (SGOT) 31  36    ALT (SGPT) 24  42    Albumin/Globulin Ratio 1.7  2.1    BUN/Creatinine Ratio 11.8  17.9    Anion Gap 8.0  2.7      CBC          10/2/2023    13:19 7/11/2024    09:56   CBC   WBC 6.09  6.21    RBC 4.19  4.20    Hemoglobin 12.7  13.0    Hematocrit 39.0  40.4    MCV 93.1  96.2    MCH 30.3  31.0  "   MCHC 32.6  32.2    RDW 12.2  13.0    Platelets 162  257      Lipid Panel          10/2/2023    13:19   Lipid Panel   Total Cholesterol 147    Triglycerides 64    HDL Cholesterol 89    VLDL Cholesterol 13    LDL Cholesterol  45    LDL/HDL Ratio 0.51      TSH          10/2/2023    13:19   TSH   TSH 2.960              No Images in the past 120 days found..     The above data has been reviewed by POLA Campbell 09/09/2024 10:45 EDT.          Patient Care Team:  Brenda Ying APRN as PCP - General (Nurse Practitioner)  Alejandro Obrien MD as Consulting Physician (Pulmonary Disease)            ASSESSMENT & PLAN    Diagnoses and all orders for this visit:    1. Anxiety and depression (Primary)  Assessment & Plan:  Stable and well-controlled with Zoloft, continue current medication    Orders:  -     sertraline (ZOLOFT) 25 MG tablet; Take 3 tablets by mouth Daily.  Dispense: 270 tablet; Refill: 1    2. Cervical cancer screening  -     Ambulatory Referral to Obstetrics / Gynecology    3. Other migraine without status migrainosus, not intractable  Assessment & Plan:  Patient with approximately 1 migraine monthly, relieved with Imitrex, discussed with patient if she has any new or worsening symptoms, increased frequency of migraines, worsening of pain, or any other new symptoms she will seek reevaluation, prescription for Imitrex sent, patient has tolerated well in the past, side effects and admin discussed    Orders:  -     SUMAtriptan (Imitrex) 50 MG tablet; Take one tablet at onset of headache. May repeat dose one time in 2 hours if headache not relieved.  Dispense: 9 tablet; Refill: 2         Tobacco Use: Medium Risk (9/9/2024)    Patient History     Smoking Tobacco Use: Former     Smokeless Tobacco Use: Never     Passive Exposure: Not on file       Follow Up     Return in about 3 months (around 12/9/2024), or if symptoms worsen or fail to improve.        Patient was given instructions and counseling  regarding her condition or for health maintenance advice. Please see specific information pulled into the AVS if appropriate.   I have reviewed information obtained and documented by others and I have confirmed the accuracy of this documented note.    POLA Campbell

## 2024-09-09 NOTE — ASSESSMENT & PLAN NOTE
Patient with approximately 1 migraine monthly, relieved with Imitrex, discussed with patient if she has any new or worsening symptoms, increased frequency of migraines, worsening of pain, or any other new symptoms she will seek reevaluation, prescription for Imitrex sent, patient has tolerated well in the past, side effects and admin discussed

## 2024-09-09 NOTE — PROGRESS NOTES
Outpatient Physical Therapy                   Physical Therapy Daily Treatment Note    Patient: Digna Baron   : 1975  Diagnosis/ICD-10 Code:  Chronic midline low back pain without sciatica [M54.50, G89.29]  Referring practitioner: Solitario Rose MD  Date of Initial Visit: Type: THERAPY  Noted: 2024  Today's Date: 2024  Patient seen for 10 sessions             Subjective   Digna Baron reports: 2/10 pain, at time of arrival located in her low back. Patient states the pain is tightness/sore.     Objective     See Exercise, Manual, and Modality Logs for complete treatment.     Assessment/Plan  Therapist discussed doing PPTs in sitting at work to help add stability and in hopes that it will decrease pain. Patient progressed to high level core stability exercises with good tolerance and no complaints of increased pain. Pt would benefit from skilled PT to address Range of Motion  and Strength deficits, pain management and any concerns with ADLs.     Progress per Plan of Care      Timed:  Manual Therapy:    0     mins  75749;  Therapeutic Exercise:    10     mins  25836;     Neuromuscular Ana:    10    mins  35416;    Therapeutic Activity:     10     mins  34054;     Gait Trainin     mins  83940;    Aquatic Therapy:     0     mins  02485;       Untimed:  Electrical Stimulation:    0     mins  43407 ( );  Mechanical Traction:    0     mins  14117;       Timed Treatment:   30   mins   Total Treatment:     30   mins      Electronically signed:   Jennifer Villa PTA  Physical Therapist Assistant  [facility] License #: [id number]

## 2024-09-18 ENCOUNTER — TREATMENT (OUTPATIENT)
Dept: PHYSICAL THERAPY | Facility: CLINIC | Age: 49
End: 2024-09-18
Payer: COMMERCIAL

## 2024-09-18 DIAGNOSIS — G89.29 CHRONIC MIDLINE LOW BACK PAIN WITHOUT SCIATICA: Primary | ICD-10-CM

## 2024-09-18 DIAGNOSIS — M51.34 DDD (DEGENERATIVE DISC DISEASE), THORACIC: ICD-10-CM

## 2024-09-18 DIAGNOSIS — R29.3 POOR POSTURE: ICD-10-CM

## 2024-09-18 DIAGNOSIS — M54.50 CHRONIC MIDLINE LOW BACK PAIN WITHOUT SCIATICA: Primary | ICD-10-CM

## 2024-09-18 DIAGNOSIS — R19.8 ABDOMINAL WEAKNESS: ICD-10-CM

## 2024-09-18 PROCEDURE — 97530 THERAPEUTIC ACTIVITIES: CPT

## 2024-09-18 PROCEDURE — 97110 THERAPEUTIC EXERCISES: CPT

## 2024-09-30 RX ORDER — FLUTICASONE PROPIONATE 50 UG/1
2 SPRAY, METERED NASAL DAILY
Qty: 18.2 ML | Refills: 0 | Status: SHIPPED | OUTPATIENT
Start: 2024-09-30

## 2024-12-06 ENCOUNTER — OFFICE VISIT (OUTPATIENT)
Dept: OBSTETRICS AND GYNECOLOGY | Facility: CLINIC | Age: 49
End: 2024-12-06
Payer: COMMERCIAL

## 2024-12-06 VITALS
HEART RATE: 71 BPM | WEIGHT: 179 LBS | SYSTOLIC BLOOD PRESSURE: 116 MMHG | HEIGHT: 65 IN | BODY MASS INDEX: 29.82 KG/M2 | DIASTOLIC BLOOD PRESSURE: 79 MMHG

## 2024-12-06 DIAGNOSIS — Z01.419 ENCOUNTER FOR GYNECOLOGICAL EXAMINATION WITHOUT ABNORMAL FINDING: Primary | ICD-10-CM

## 2024-12-06 PROCEDURE — 87624 HPV HI-RISK TYP POOLED RSLT: CPT | Performed by: NURSE PRACTITIONER

## 2024-12-06 PROCEDURE — G0123 SCREEN CERV/VAG THIN LAYER: HCPCS | Performed by: NURSE PRACTITIONER

## 2024-12-06 NOTE — PROGRESS NOTES
"Well Woman Visit    CC: Scheduled annual well gyn visit  Chief Complaint   Patient presents with    Gynecologic Exam     wwe         HPI:   49 y.o.   Social History     Substance and Sexual Activity   Sexual Activity Not Currently    Partners: Male    Birth control/protection: Vasectomy       Menses:  Denies any vaginal bleeding  Pain with menses:  None      PCP: does manage PMHx and preventative labs  History: PMHx, Meds, Allergies, PSHx, Social Hx, and POBHx all reviewed and updated.    Pt has no complaints today.    PHYSICAL EXAM:  /79   Pulse 71   Ht 165.1 cm (65\")   Wt 81.2 kg (179 lb)   BMI 29.79 kg/m²  Not found.     Exam conducted with a chaperone present  General- NAD, alert and oriented, appropriate  Psych- Normal mood, good memory  Neck- No masses, no thyroid enlargement  CV- Regular rhythm, no murnurs  Resp- CTA to bases, no wheezes  Abdomen- Soft, non distended, non tender, no masses    Breast left-  deferred  Breast right- deferred    External genitalia- Normal female, no lesions  Urethra/meatus- Normal, no masses, non tender  Bladder- Normal, no masses, non tender  Vagina- Normal, no atrophy, no lesions, no discharge.  Prolapse : none noted   Cvx- Normal, no lesions, no discharge, No cervical motion tenderness  Uterus- Normal size, shape & consistency.  Non tender, mobile.  Adnexa- No mass, non tender  Anus/Rectum/Perineum- Not performed    Lymphatic- No palpable neck, axillary, or groin nodes  Ext- No edema, no cyanosis    Skin- No lesions, no rashes, no acanthosis nigricans      ASSESSMENT and PLAN:    Diagnoses and all orders for this visit:    1. Encounter for gynecological examination without abnormal finding (Primary)  -     IgP, Aptima HPV    Other orders  -     LABS SCANNED  -     LABS SCANNED        Preventative:  BREAST HEALTH- Monthly self breast exam importance and how to reviewed. MMG and/or MRI (prn) reviewed per society guidelines and her individual history. Screen: " Already up to date  CERVICAL CANCER Screening- Reviewed current ASCCP guidelines for screening w and wo cotest HPV, age specific.  Screen: Updated today  COLON CANCER Screening- Reviewed current medical society guidelines and options.  Screen:  Already up to date  SEXUAL HEALTH: Declines STD screening  VACCINATIONS Recommended: Covid vaccine, Flu annually.  Importance discussed, risk being unvaccinated reviewed.  Questions answered  Smoking status- NON SMOKER/VAPER        She understands the importance of having any ordered tests to be performed in a timely fashion.  The risks of not performing them include, but are not limited to, advanced cancer stages, bone loss from osteoporosis and/or subsequent increase in morbidity and/or mortality.  She is encouraged to review her results online and/or contact or office if she has questions.     Follow Up:  Return in about 1 year (around 12/6/2025) for Annual physical.        Garrison Cortez, APRN  12/06/2024    [facility] [address]  [facility]  [address] [place] [address]  Dept: [phone]  Dept Fax: [phone]  Loc: [phone]

## 2024-12-14 LAB
CYTOLOGIST CVX/VAG CYTO: NORMAL
CYTOLOGY CVX/VAG DOC CYTO: NORMAL
CYTOLOGY CVX/VAG DOC THIN PREP: NORMAL
DX ICD CODE: NORMAL
HPV I/H RISK 4 DNA CVX QL PROBE+SIG AMP: NEGATIVE
Lab: NORMAL
OTHER STN SPEC: NORMAL
PATHOLOGIST CVX/VAG CYTO: NORMAL
STAT OF ADQ CVX/VAG CYTO-IMP: NORMAL

## 2024-12-16 ENCOUNTER — LAB (OUTPATIENT)
Dept: ONCOLOGY | Facility: HOSPITAL | Age: 49
End: 2024-12-16
Payer: COMMERCIAL

## 2024-12-16 DIAGNOSIS — C50.512 MALIGNANT NEOPLASM OF LOWER-OUTER QUADRANT OF LEFT BREAST OF FEMALE, ESTROGEN RECEPTOR POSITIVE: ICD-10-CM

## 2024-12-16 DIAGNOSIS — Z17.0 MALIGNANT NEOPLASM OF LOWER-OUTER QUADRANT OF LEFT BREAST OF FEMALE, ESTROGEN RECEPTOR POSITIVE: ICD-10-CM

## 2024-12-16 LAB
ALBUMIN SERPL-MCNC: 4 G/DL (ref 3.5–5.2)
ALBUMIN/GLOB SERPL: 1.5 G/DL
ALP SERPL-CCNC: 68 U/L (ref 39–117)
ALT SERPL W P-5'-P-CCNC: 29 U/L (ref 1–33)
ANION GAP SERPL CALCULATED.3IONS-SCNC: 8.8 MMOL/L (ref 5–15)
AST SERPL-CCNC: 32 U/L (ref 1–32)
BASOPHILS # BLD AUTO: 0.05 10*3/MM3 (ref 0–0.2)
BASOPHILS NFR BLD AUTO: 0.9 % (ref 0–1.5)
BILIRUB SERPL-MCNC: 0.2 MG/DL (ref 0–1.2)
BUN SERPL-MCNC: 14 MG/DL (ref 6–20)
BUN/CREAT SERPL: 16.9 (ref 7–25)
CALCIUM SPEC-SCNC: 9.3 MG/DL (ref 8.6–10.5)
CHLORIDE SERPL-SCNC: 108 MMOL/L (ref 98–107)
CO2 SERPL-SCNC: 24.2 MMOL/L (ref 22–29)
CREAT SERPL-MCNC: 0.83 MG/DL (ref 0.57–1)
DEPRECATED RDW RBC AUTO: 43.4 FL (ref 37–54)
EGFRCR SERPLBLD CKD-EPI 2021: 86.5 ML/MIN/1.73
EOSINOPHIL # BLD AUTO: 0.15 10*3/MM3 (ref 0–0.4)
EOSINOPHIL NFR BLD AUTO: 2.8 % (ref 0.3–6.2)
ERYTHROCYTE [DISTWIDTH] IN BLOOD BY AUTOMATED COUNT: 12.8 % (ref 12.3–15.4)
GLOBULIN UR ELPH-MCNC: 2.7 GM/DL
GLUCOSE SERPL-MCNC: 95 MG/DL (ref 65–99)
HCT VFR BLD AUTO: 39.3 % (ref 34–46.6)
HGB BLD-MCNC: 13.2 G/DL (ref 12–15.9)
IMM GRANULOCYTES # BLD AUTO: 0.01 10*3/MM3 (ref 0–0.05)
IMM GRANULOCYTES NFR BLD AUTO: 0.2 % (ref 0–0.5)
LYMPHOCYTES # BLD AUTO: 2.35 10*3/MM3 (ref 0.7–3.1)
LYMPHOCYTES NFR BLD AUTO: 43.3 % (ref 19.6–45.3)
MCH RBC QN AUTO: 31.1 PG (ref 26.6–33)
MCHC RBC AUTO-ENTMCNC: 33.6 G/DL (ref 31.5–35.7)
MCV RBC AUTO: 92.7 FL (ref 79–97)
MONOCYTES # BLD AUTO: 0.32 10*3/MM3 (ref 0.1–0.9)
MONOCYTES NFR BLD AUTO: 5.9 % (ref 5–12)
NEUTROPHILS NFR BLD AUTO: 2.55 10*3/MM3 (ref 1.7–7)
NEUTROPHILS NFR BLD AUTO: 46.9 % (ref 42.7–76)
NRBC BLD AUTO-RTO: 0 /100 WBC (ref 0–0.2)
PLATELET # BLD AUTO: 217 10*3/MM3 (ref 140–450)
PMV BLD AUTO: 10 FL (ref 6–12)
POTASSIUM SERPL-SCNC: 3.9 MMOL/L (ref 3.5–5.2)
PROT SERPL-MCNC: 6.7 G/DL (ref 6–8.5)
RBC # BLD AUTO: 4.24 10*6/MM3 (ref 3.77–5.28)
SODIUM SERPL-SCNC: 141 MMOL/L (ref 136–145)
WBC NRBC COR # BLD AUTO: 5.43 10*3/MM3 (ref 3.4–10.8)

## 2024-12-16 PROCEDURE — 36415 COLL VENOUS BLD VENIPUNCTURE: CPT

## 2024-12-16 PROCEDURE — 80053 COMPREHEN METABOLIC PANEL: CPT

## 2024-12-16 PROCEDURE — 85025 COMPLETE CBC W/AUTO DIFF WBC: CPT

## 2024-12-18 ENCOUNTER — OFFICE VISIT (OUTPATIENT)
Dept: ONCOLOGY | Facility: HOSPITAL | Age: 49
End: 2024-12-18
Payer: COMMERCIAL

## 2024-12-18 VITALS
DIASTOLIC BLOOD PRESSURE: 70 MMHG | BODY MASS INDEX: 29.96 KG/M2 | OXYGEN SATURATION: 96 % | HEIGHT: 65 IN | WEIGHT: 179.8 LBS | RESPIRATION RATE: 18 BRPM | TEMPERATURE: 97.6 F | SYSTOLIC BLOOD PRESSURE: 118 MMHG | HEART RATE: 65 BPM

## 2024-12-18 DIAGNOSIS — C50.512 MALIGNANT NEOPLASM OF LOWER-OUTER QUADRANT OF LEFT BREAST OF FEMALE, ESTROGEN RECEPTOR POSITIVE: Primary | ICD-10-CM

## 2024-12-18 DIAGNOSIS — Z85.3 HISTORY OF BREAST CANCER: ICD-10-CM

## 2024-12-18 DIAGNOSIS — Z17.0 MALIGNANT NEOPLASM OF LOWER-OUTER QUADRANT OF LEFT BREAST OF FEMALE, ESTROGEN RECEPTOR POSITIVE: Primary | ICD-10-CM

## 2024-12-18 PROCEDURE — G0463 HOSPITAL OUTPT CLINIC VISIT: HCPCS | Performed by: INTERNAL MEDICINE

## 2024-12-18 RX ORDER — TAMOXIFEN CITRATE 10 MG/1
20 TABLET ORAL DAILY
Qty: 180 TABLET | Refills: 1 | Status: SHIPPED | OUTPATIENT
Start: 2024-12-18

## 2024-12-18 NOTE — PROGRESS NOTES
Chief Complaint/Care Team   Malignant neoplasm of lower-outer quadrant of left breast o    Brenda Ying, AP*  Brenda Ying, APRN    History of Present Illness     Diagnosis: ER+ Left Breast Cancer    Current Treatment: Tamoxifen which began in 6/2019  Previous Treatment:     ER+ Left Breast Cancer:    - diagnosed at age 44  - Bilateral skin sparing mastectomy and left SLNB on 12/21/18: invastive ductal carcinoma of the left breast, grade 2, 12mm focus, negative margins, closest was 4mm, not associated with DCIS, 1 of 2 LN with micrometastatic disease, pT1c pN1mi (stage IA), ER+, MO+, HER2 negative  - treatment with adjuvant chemotherapy with TC but no radiation.   - on Tamoxifen since 2019  -pt transitioned care to Dr. Ervin on 3/5/2024  -Patient underwent breast cancer index testing in 2024 which indicated benefit for extended endocrine therapy, thus patient due to stop tamoxifen in July 1, 2029    Digna Baron is a 49 y.o. female who presents to [facility] for ER+ Left Breast Cancer, she is currently receiving treatment with tamoxifen, which she began in 6/2024. She again reports tolerating this medication well. No report of any vaginal bleeding. She reports compliance with zoloft and improvement in mood as a result.  Patient underwent breast cancer index testing here to discuss those results no breast concerns reported today.    Review of Systems   Constitutional:  Negative for appetite change, diaphoresis, fatigue, fever, unexpected weight gain and unexpected weight loss.   HENT:  Negative for hearing loss, mouth sores, sore throat, swollen glands, trouble swallowing and voice change.    Eyes:  Negative for blurred vision.   Respiratory:  Negative for cough, shortness of breath and wheezing.    Cardiovascular:  Negative for chest pain and palpitations.   Gastrointestinal:  Negative for abdominal pain, blood in stool, constipation, diarrhea, nausea and  "vomiting.   Endocrine: Negative for cold intolerance and heat intolerance.   Genitourinary:  Negative for difficulty urinating, dysuria, frequency, hematuria and urinary incontinence.   Musculoskeletal:  Negative for arthralgias, back pain and myalgias.   Skin:  Negative for rash, skin lesions and wound.   Neurological:  Negative for dizziness, seizures, weakness, numbness and headache.   Hematological:  Does not bruise/bleed easily.   Psychiatric/Behavioral:  Negative for depressed mood. The patient is not nervous/anxious.    All other systems reviewed and are negative.       Oncology/Hematology History Overview Note     ER+ Left Breast Cancer:    - diagnosed at age 44  - Bilateral skin sparing mastectomy and left SLNB on 12/21/18: invastive ductal carcinoma of the left breast, grade 2, 12mm focus, negative margins, closest was 4mm, not associated with DCIS, 1 of 2 LN with micrometastatic disease, pT1c pN1mi (stage IA), ER+, HI+, HER2 negative  - treatment with adjuvant chemotherapy with TC but no radiation.   - on Tamoxifen since 2019     Malignant neoplasm metastatic to lymph nodes   12/28/2018 Initial Diagnosis    Malignant neoplasm metastatic to lymph nodes (HCC)     Malignant neoplasm of lower-outer quadrant of left female breast   12/6/2018 Initial Diagnosis    Malignant neoplasm of lower-outer quadrant of left female breast (HCC)         Objective     Vitals:    12/18/24 1133   BP: 118/70   Pulse: 65   Resp: 18   Temp: 97.6 °F (36.4 °C)   TempSrc: Temporal   SpO2: 96%   Weight: 81.6 kg (179 lb 12.8 oz)   Height: 165.1 cm (65\")   PainSc: 0-No pain         ECOG score: 0         PHQ-9 Total Score:         Physical Exam  Vitals reviewed. Exam conducted with a chaperone present.   Constitutional:       General: She is not in acute distress.     Appearance: Normal appearance.   HENT:      Head: Normocephalic and atraumatic.   Eyes:      Extraocular Movements: Extraocular movements intact.      Conjunctiva/sclera: " Conjunctivae normal.   Pulmonary:      Effort: Pulmonary effort is normal.   Musculoskeletal:      Cervical back: Normal range of motion and neck supple.   Skin:     General: Skin is warm and dry.      Findings: No bruising.   Neurological:      Mental Status: She is oriented to person, place, and time.           Past Medical History     Past Medical History:   Diagnosis Date    Alcoholism     Have not had any alcohol since 2009    Anemia     Slightly anemic throughout my life    Breast cancer     Cancer 11/26/2018    Breast    Migraine     Unsure, happening for years but unaware that they were migraines until about August 2024    PMS (premenstrual syndrome)     Substance abuse     Have not had any mind or mood altering drugs since 2010 and am in recovery    Urinary tract infection     Multiple over the years.    Varicella     As a child     Current Outpatient Medications on File Prior to Visit   Medication Sig Dispense Refill    Ferrous Sulfate Dried (FERROUS SULFATE IRON PO) Take  by mouth.      fluticasone (FLONASE) 50 MCG/ACT nasal spray Administer 2 sprays into the nostril(s) as directed by provider Daily. 18.2 mL 0    multivitamin with minerals tablet tablet Take 1 tablet by mouth Daily.      Omega-3 Fatty Acids (fish oil) 1000 MG capsule capsule Take  by mouth Daily With Breakfast.      sertraline (ZOLOFT) 25 MG tablet Take 3 tablets by mouth Daily. 270 tablet 1    SUMAtriptan (Imitrex) 50 MG tablet Take one tablet at onset of headache. May repeat dose one time in 2 hours if headache not relieved. 9 tablet 2    vitamin B-12 (CYANOCOBALAMIN) 1000 MCG tablet Take 1 tablet by mouth Daily.      vitamin C (ASCORBIC ACID) 250 MG tablet Take 1 tablet by mouth Daily.      Zinc Acetate, Oral, (ZINC ACETATE PO) Take  by mouth.       No current facility-administered medications on file prior to visit.      No Known Allergies  Past Surgical History:   Procedure Laterality Date    BREAST BIOPSY      BREAST SURGERY   2018    Bilateral Mastectomy and implant exchange in May 2019     SECTION  1996    Daughter's Birth    COLONOSCOPY N/A 2023    Procedure: COLONOSCOPY;  Surgeon: Tylor Bahena MD;  [facility]: [facility] ENDOSCOPY;  Service: General;  Laterality: N/A;  normal colonoscopy    LYMPH NODE BIOPSY  2018    Breast cancer    MASTECTOMY  2018    Bilateral due to breast cancer    US GUIDED LYMPH NODE BIOPSY  08/10/2023    WISDOM TOOTH EXTRACTION       Social History     Socioeconomic History    Marital status:    Tobacco Use    Smoking status: Former     Current packs/day: 0.00     Average packs/day: 1 pack/day for 20.2 years (20.2 ttl pk-yrs)     Types: Cigarettes     Start date: 1992     Quit date: 2012     Years since quittin.4    Smokeless tobacco: Never   Vaping Use    Vaping status: Never Used   Substance and Sexual Activity    Alcohol use: Not Currently     Comment: Heavy    Drug use: Yes     Types: Hashish, Marijuana, Methamphetamines     Comment: 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 He's been clean most of my life    Cancer Maternal Grandfather         Bladder cancer    Diabetes Maternal Grandfather         Extreme kidney failure. One at 0% function    Heart disease Maternal Grandfather         Heart attack when he was 50    Kidney disease Maternal Grandfather     Cancer Paternal Grandmother         Breast cancer    Breast cancer Paternal Grandmother     Cancer Paternal Aunt         Gall bladder cancer    Early death Paternal Aunt         Passed from the cancer at 41 yo    Thyroid disease Daughter         Hypothyroidism       Results     Result Review   The following data was reviewed by: Johnathon Ervin MD on 2024:  Lab Results   Component Value Date    HGB  13.2 12/16/2024    HCT 39.3 12/16/2024    MCV 92.7 12/16/2024     12/16/2024    WBC 5.43 12/16/2024    NEUTROABS 2.55 12/16/2024    LYMPHSABS 2.35 12/16/2024    MONOSABS 0.32 12/16/2024    EOSABS 0.15 12/16/2024    BASOSABS 0.05 12/16/2024     Lab Results   Component Value Date    GLUCOSE 95 12/16/2024    BUN 14 12/16/2024    CREATININE 0.83 12/16/2024     12/16/2024    K 3.9 12/16/2024     (H) 12/16/2024    CO2 24.2 12/16/2024    CALCIUM 9.3 12/16/2024    PROTEINTOT 6.7 12/16/2024    ALBUMIN 4.0 12/16/2024    BILITOT 0.2 12/16/2024    ALKPHOS 68 12/16/2024    AST 32 12/16/2024    ALT 29 12/16/2024     Lab Results   Component Value Date    FREET4 1.07 10/02/2023    TSH 2.960 10/02/2023           No radiology results for the last day       Assessment & Plan     Diagnoses and all orders for this visit:    1. Malignant neoplasm of lower-outer quadrant of left breast of female, estrogen receptor positive (Primary)  -     tamoxifen (NOLVADEX) 10 MG tablet; Take 2 tablets by mouth Daily.  Dispense: 180 tablet; Refill: 1  -     CBC & Differential; Future  -     Comprehensive Metabolic Panel; Future    2. History of breast cancer  -     tamoxifen (NOLVADEX) 10 MG tablet; Take 2 tablets by mouth Daily.  Dispense: 180 tablet; Refill: 1            Digna Baron is a 49 y.o. female who presents to [facility] for ER+ Left Breast Cancer, she is currently receiving treatment with tamoxifen,    -Results of most recent US of breast was negative from 3/1/2024, no breast concerns reported today  -pt is s/p mastectomy   -Patient underwent breast cancer index (BCI) testing in 2024 which indicated benefit for extended endocrine therapy, thus patient due to stop tamoxifen in July 1, 2029, discussed results with pt today  -overall pt tolerating tamoxifen well, recommend continuing for now, provided refill today  -discussed results of most recent CBC, CMP    Back pain  -pt  underwent MRI spine ordered by PCP and I ordered NM bone scan in 3/2024 to assess for disease recurrence, both were negative for metastatic disease in 3/2024  - no bone pain reported today    -plan for pt follow up in 6 months with labs CBC, CMP.    Please note that portions of this note were completed with a voice recognition program.    Electronically signed by Johnathon Ervin MD, 12/19/24, 2:47 PM EST.        Follow Up     I spent 30 minutes caring for Digna on this date of service. This time includes time spent by me in the following activities:preparing for the visit, reviewing tests, obtaining and/or reviewing a separately obtained history, performing a medically appropriate examination and/or evaluation , counseling and educating the patient/family/caregiver, ordering medications, tests, or procedures, referring and communicating with other health care professionals , documenting information in the medical record, independently interpreting results and communicating that information with the patient/family/caregiver, and care coordination.    This is an acute or chronic illness that poses a threat to life or bodily function. The above treatment plan involves a high risk of complications and/or mortality of patient management.    The patient was seen and examined. Work by the provider also included review and/or ordering of lab tests, review and/or ordering of radiology tests, review and/or ordering of medicine tests, discussion with other physicians or providers, independent review of data, obtaining old records, review/summation of old records, and/or other review.    I have reviewed the family history, social history, and past medical history for this patient. Previous information and data has been reviewed and updated as needed. I have reviewed and verified the chief complaint, history, and other documentation. The patient was interviewed and examined in the clinic and the chart reviewed. The previous  observations, recommendations, and conclusions were reviewed including those of other providers.     The plan was discussed with the patient and/or family. The patient was given time to ask questions and these questions were answered. At the conclusion of their visit they had no additional questions or concerns and all questions were answered to their satisfaction.    Patient was given instructions and counseling regarding her condition or for health maintenance advice. Please see specific information pulled into the AVS if appropriate.        28-Dec-2018

## 2025-01-06 DIAGNOSIS — G43.809 OTHER MIGRAINE WITHOUT STATUS MIGRAINOSUS, NOT INTRACTABLE: ICD-10-CM

## 2025-01-07 RX ORDER — SUMATRIPTAN 50 MG/1
TABLET, FILM COATED ORAL
Qty: 9 TABLET | Refills: 0 | Status: SHIPPED | OUTPATIENT
Start: 2025-01-07

## 2025-02-12 DIAGNOSIS — G43.809 OTHER MIGRAINE WITHOUT STATUS MIGRAINOSUS, NOT INTRACTABLE: ICD-10-CM

## 2025-02-13 RX ORDER — SUMATRIPTAN 50 MG/1
TABLET, FILM COATED ORAL
Qty: 9 TABLET | Refills: 0 | Status: SHIPPED | OUTPATIENT
Start: 2025-02-13

## 2025-02-24 ENCOUNTER — OFFICE VISIT (OUTPATIENT)
Dept: FAMILY MEDICINE CLINIC | Facility: CLINIC | Age: 50
End: 2025-02-24
Payer: COMMERCIAL

## 2025-02-24 VITALS
HEART RATE: 74 BPM | HEIGHT: 65 IN | OXYGEN SATURATION: 99 % | DIASTOLIC BLOOD PRESSURE: 77 MMHG | WEIGHT: 183 LBS | SYSTOLIC BLOOD PRESSURE: 110 MMHG | BODY MASS INDEX: 30.49 KG/M2

## 2025-02-24 DIAGNOSIS — Z00.00 ANNUAL PHYSICAL EXAM: Primary | ICD-10-CM

## 2025-02-24 DIAGNOSIS — F32.A ANXIETY AND DEPRESSION: ICD-10-CM

## 2025-02-24 DIAGNOSIS — Z13.220 LIPID SCREENING: ICD-10-CM

## 2025-02-24 DIAGNOSIS — F41.9 ANXIETY AND DEPRESSION: ICD-10-CM

## 2025-02-24 DIAGNOSIS — G43.809 OTHER MIGRAINE WITHOUT STATUS MIGRAINOSUS, NOT INTRACTABLE: ICD-10-CM

## 2025-02-24 DIAGNOSIS — Z13.29 THYROID DISORDER SCREEN: ICD-10-CM

## 2025-02-24 PROCEDURE — 99396 PREV VISIT EST AGE 40-64: CPT | Performed by: NURSE PRACTITIONER

## 2025-02-24 RX ORDER — SUMATRIPTAN 50 MG/1
TABLET, FILM COATED ORAL
Qty: 9 TABLET | Refills: 4 | Status: SHIPPED | OUTPATIENT
Start: 2025-02-24

## 2025-02-24 RX ORDER — SERTRALINE HYDROCHLORIDE 25 MG/1
75 TABLET, FILM COATED ORAL DAILY
Qty: 270 TABLET | Refills: 1 | Status: SHIPPED | OUTPATIENT
Start: 2025-02-24

## 2025-02-24 NOTE — PROGRESS NOTES
Chief Complaint  Annual Exam, follow-up migraines    SUBJECTIVE  Digna Baron presents to [facility] for annual exam and three month follow up on Migraines, anxiety and depression. Pt reports doing well on Sumatriptan. States having migraine 2-3 times per month, sumatriptan works well, migraines are just like her typical migraine headaches, no new symptoms    Colonoscopy consult scheduled for 06/23/25.     Patient reports that she recently started compounded zepbound through weight watchers.  States she is doing well, has lost 5 pounds thus far.     Patient works as a volunteer at [employer], states that they have recommended she get a rabies vaccination if possible.  She is interested in seeing if her insurance will cover this.     History of Present Illness  Past Medical History:   Diagnosis Date    Alcoholism     Have not had any alcohol since 2009    Anemia     Slightly anemic throughout my life    Breast cancer     Cancer 11/26/2018    Breast    Migraine     Unsure, happening for years but unaware that they were migraines until about August 2024    PMS (premenstrual syndrome)     Substance abuse     Have not had any mind or mood altering drugs since 2010 and am in recovery    Urinary tract infection     Multiple over the years.    Varicella     As a child      Family History   Problem Relation Age of Onset    Drug abuse Father         When he was very young. He's been clean most of my life    Cancer Maternal Grandfather         Bladder cancer    Diabetes Maternal Grandfather         Extreme kidney failure. One at 0% function    Heart disease Maternal Grandfather         Heart attack when he was 50    Kidney disease Maternal Grandfather     Cancer Paternal Grandmother         Breast cancer    Breast cancer Paternal Grandmother     Cancer Paternal Aunt         Gall bladder cancer    Early death Paternal Aunt         Passed from the cancer at 39 yo    Thyroid disease  "Daughter         Hypothyroidism      Past Surgical History:   Procedure Laterality Date    BREAST BIOPSY      BREAST SURGERY  2018    Bilateral Mastectomy and implant exchange in May 2019     SECTION  1996    Daughter's Birth    COLONOSCOPY N/A 2023    Procedure: COLONOSCOPY;  Surgeon: Tylor Bahena MD;  Location: [facility] ENDOSCOPY;  Service: General;  Laterality: N/A;  normal colonoscopy    LYMPH NODE BIOPSY  2018    Breast cancer    MASTECTOMY  2018    Bilateral due to breast cancer    US GUIDED LYMPH NODE BIOPSY  08/10/2023    WISDOM TOOTH EXTRACTION          Current Outpatient Medications:     Ferrous Sulfate Dried (FERROUS SULFATE IRON PO), Take  by mouth., Disp: , Rfl:     fluticasone (FLONASE) 50 MCG/ACT nasal spray, Administer 2 sprays into the nostril(s) as directed by provider Daily., Disp: 18.2 mL, Rfl: 0    multivitamin with minerals tablet tablet, Take 1 tablet by mouth Daily., Disp: , Rfl:     Omega-3 Fatty Acids (fish oil) 1000 MG capsule capsule, Take  by mouth Daily With Breakfast., Disp: , Rfl:     sertraline (ZOLOFT) 25 MG tablet, Take 3 tablets by mouth Daily., Disp: 270 tablet, Rfl: 1    SUMAtriptan (IMITREX) 50 MG tablet, Take one tablet at onset of headache. May repeat dose one time in 2 hours if headache not relieved., Disp: 9 tablet, Rfl: 4    tamoxifen (NOLVADEX) 10 MG tablet, Take 2 tablets by mouth Daily., Disp: 180 tablet, Rfl: 1    Tirzepatide-Weight Management (ZEPBOUND SC), Inject  under the skin into the appropriate area as directed. [employer], Disp: , Rfl:     vitamin B-12 (CYANOCOBALAMIN) 1000 MCG tablet, Take 1 tablet by mouth Daily., Disp: , Rfl:     vitamin C (ASCORBIC ACID) 250 MG tablet, Take 1 tablet by mouth Daily., Disp: , Rfl:     Zinc Acetate, Oral, (ZINC ACETATE PO), Take  by mouth., Disp: , Rfl:     OBJECTIVE  Vital Signs:   /77   Pulse 74   Ht 165.1 cm (65\")   Wt 83 kg (183 lb)   SpO2 99%   BMI 30.45 kg/m²  " "  Estimated body mass index is 30.45 kg/m² as calculated from the following:    Height as of this encounter: 165.1 cm (65\").    Weight as of this encounter: 83 kg (183 lb).     Wt Readings from Last 3 Encounters:   02/24/25 83 kg (183 lb)   12/18/24 81.6 kg (179 lb 12.8 oz)   12/06/24 81.2 kg (179 lb)     BP Readings from Last 3 Encounters:   02/24/25 110/77   12/18/24 118/70   12/06/24 116/79       Physical Exam  Vitals reviewed.   Constitutional:       General: She is not in acute distress.     Appearance: Normal appearance. She is well-developed. She is not diaphoretic.   HENT:      Head: Normocephalic and atraumatic. Hair is normal.      Right Ear: Hearing, tympanic membrane, ear canal and external ear normal. No decreased hearing noted. No drainage.      Left Ear: Hearing, tympanic membrane, ear canal and external ear normal. No decreased hearing noted.      Nose: Nose normal. No nasal deformity.      Mouth/Throat:      Mouth: Mucous membranes are moist.   Eyes:      General: Lids are normal.         Right eye: No discharge.         Left eye: No discharge.      Extraocular Movements: Extraocular movements intact.      Conjunctiva/sclera: Conjunctivae normal.      Pupils: Pupils are equal, round, and reactive to light.   Neck:      Thyroid: No thyromegaly.      Vascular: No JVD.   Cardiovascular:      Rate and Rhythm: Normal rate and regular rhythm.      Pulses: Normal pulses.      Heart sounds: Normal heart sounds. No murmur heard.     No friction rub. No gallop.   Pulmonary:      Effort: Pulmonary effort is normal. No respiratory distress.      Breath sounds: Normal breath sounds. No wheezing, rhonchi or rales.   Chest:      Chest wall: No tenderness.   Abdominal:      General: Bowel sounds are normal. There is no distension.      Palpations: Abdomen is soft. There is no mass.      Tenderness: There is no abdominal tenderness. There is no guarding or rebound.      Hernia: No hernia is present. " "  Musculoskeletal:         General: No tenderness or deformity. Normal range of motion.      Cervical back: Normal range of motion and neck supple.   Lymphadenopathy:      Cervical: No cervical adenopathy.   Skin:     General: Skin is warm and dry.      Findings: No erythema or rash.   Neurological:      Mental Status: She is alert and oriented to person, place, and time.      Cranial Nerves: No cranial nerve deficit.      Motor: No abnormal muscle tone.      Coordination: Coordination normal.      Deep Tendon Reflexes: Reflexes are normal and symmetric. Reflexes normal.   Psychiatric:         Mood and Affect: Mood and affect normal.         Behavior: Behavior normal.         Thought Content: Thought content normal.         Judgment: Judgment normal.          Result Review    CMP          7/11/2024    09:56 12/16/2024    11:44   CMP   Glucose 93  95    BUN 14  14    Creatinine 0.78  0.83    EGFR 93.2  86.5    Sodium 141  141    Potassium 4.2  3.9    Chloride 108  108    Calcium 9.3  9.3    Total Protein 6.1  6.7    Albumin 4.1  4.0    Globulin 2.0  2.7    Total Bilirubin 0.3  0.2    Alkaline Phosphatase 68  68    AST (SGOT) 36  32    ALT (SGPT) 42  29    Albumin/Globulin Ratio 2.1  1.5    BUN/Creatinine Ratio 17.9  16.9    Anion Gap 2.7  8.8      CBC          7/11/2024    09:56 12/16/2024    11:44   CBC   WBC 6.21  5.43    RBC 4.20  4.24    Hemoglobin 13.0  13.2    Hematocrit 40.4  39.3    MCV 96.2  92.7    MCH 31.0  31.1    MCHC 32.2  33.6    RDW 13.0  12.8    Platelets 257  217            No components found for: \"EIYM16RM\"  No results found for: \"TESTOSTERONE\"    No Images in the past 120 days found..     The above data has been reviewed by POLA Campbell 02/24/2025 12:04 EST.          Patient Care Team:  Brenda Ying APRN as PCP - General (Nurse Practitioner)  Alejandro Obrien MD as Consulting Physician (Pulmonary Disease)            ASSESSMENT & PLAN    Diagnoses and all orders for this " visit:    1. Annual physical exam (Primary)  -     Comprehensive Metabolic Panel; Future  -     CBC & Differential; Future  -     Lipid Panel; Future  -     TSH Rfx On Abnormal To Free T4; Future    2. Lipid screening  -     Lipid Panel; Future    3. Thyroid disorder screen  -     TSH Rfx On Abnormal To Free T4; Future    4. Other migraine without status migrainosus, not intractable  Assessment & Plan:  Migraine stable and well-controlled with sumatriptan, continue current medication    Orders:  -     SUMAtriptan (IMITREX) 50 MG tablet; Take one tablet at onset of headache. May repeat dose one time in 2 hours if headache not relieved.  Dispense: 9 tablet; Refill: 4    5. Anxiety and depression  Overview:  Stable and well-controlled on Zoloft, continue current medication    Orders:  -     sertraline (ZOLOFT) 25 MG tablet; Take 3 tablets by mouth Daily.  Dispense: 270 tablet; Refill: 1         Tobacco Use: Medium Risk (2/24/2025)    Patient History     Smoking Tobacco Use: Former     Smokeless Tobacco Use: Never     Passive Exposure: Not on file     The patient is advised to follow healthy diet and exercise, continue current medications, continue current healthy lifestyle patterns, continue to work on additional weight loss, and return for routine annual checkups.      Follow Up     Return in about 6 months (around 8/24/2025), or if symptoms worsen or fail to improve.        Patient was given instructions and counseling regarding her condition or for health maintenance advice. Please see specific information pulled into the AVS if appropriate.   I have reviewed information obtained and documented by others and I have confirmed the accuracy of this documented note.    POLA Campbell

## 2025-03-29 DIAGNOSIS — G43.809 OTHER MIGRAINE WITHOUT STATUS MIGRAINOSUS, NOT INTRACTABLE: ICD-10-CM

## 2025-03-31 RX ORDER — SUMATRIPTAN 50 MG/1
TABLET, FILM COATED ORAL
Qty: 9 TABLET | Refills: 4 | OUTPATIENT
Start: 2025-03-31

## 2025-04-21 ENCOUNTER — LAB (OUTPATIENT)
Dept: LAB | Facility: HOSPITAL | Age: 50
End: 2025-04-21
Payer: COMMERCIAL

## 2025-04-21 DIAGNOSIS — Z00.00 ANNUAL PHYSICAL EXAM: ICD-10-CM

## 2025-04-21 DIAGNOSIS — Z13.220 LIPID SCREENING: ICD-10-CM

## 2025-04-21 DIAGNOSIS — Z13.29 THYROID DISORDER SCREEN: ICD-10-CM

## 2025-04-21 LAB
ALBUMIN SERPL-MCNC: 3.9 G/DL (ref 3.5–5.2)
ALBUMIN/GLOB SERPL: 1.7 G/DL
ALP SERPL-CCNC: 56 U/L (ref 39–117)
ALT SERPL W P-5'-P-CCNC: 18 U/L (ref 1–33)
ANION GAP SERPL CALCULATED.3IONS-SCNC: 10.9 MMOL/L (ref 5–15)
AST SERPL-CCNC: 26 U/L (ref 1–32)
BASOPHILS # BLD AUTO: 0.07 10*3/MM3 (ref 0–0.2)
BASOPHILS NFR BLD AUTO: 0.9 % (ref 0–1.5)
BILIRUB SERPL-MCNC: 0.2 MG/DL (ref 0–1.2)
BUN SERPL-MCNC: 15 MG/DL (ref 6–20)
BUN/CREAT SERPL: 15.3 (ref 7–25)
CALCIUM SPEC-SCNC: 9.6 MG/DL (ref 8.6–10.5)
CHLORIDE SERPL-SCNC: 105 MMOL/L (ref 98–107)
CHOLEST SERPL-MCNC: 161 MG/DL (ref 0–200)
CO2 SERPL-SCNC: 24.1 MMOL/L (ref 22–29)
CREAT SERPL-MCNC: 0.98 MG/DL (ref 0.57–1)
DEPRECATED RDW RBC AUTO: 43.5 FL (ref 37–54)
EGFRCR SERPLBLD CKD-EPI 2021: 70.5 ML/MIN/1.73
EOSINOPHIL # BLD AUTO: 0.19 10*3/MM3 (ref 0–0.4)
EOSINOPHIL NFR BLD AUTO: 2.5 % (ref 0.3–6.2)
ERYTHROCYTE [DISTWIDTH] IN BLOOD BY AUTOMATED COUNT: 12.4 % (ref 12.3–15.4)
GLOBULIN UR ELPH-MCNC: 2.3 GM/DL
GLUCOSE SERPL-MCNC: 91 MG/DL (ref 65–99)
HCT VFR BLD AUTO: 40.9 % (ref 34–46.6)
HDLC SERPL-MCNC: 77 MG/DL (ref 40–60)
HGB BLD-MCNC: 13.3 G/DL (ref 12–15.9)
IMM GRANULOCYTES # BLD AUTO: 0.02 10*3/MM3 (ref 0–0.05)
IMM GRANULOCYTES NFR BLD AUTO: 0.3 % (ref 0–0.5)
LDLC SERPL CALC-MCNC: 73 MG/DL (ref 0–100)
LDLC/HDLC SERPL: 0.96 {RATIO}
LYMPHOCYTES # BLD AUTO: 1.64 10*3/MM3 (ref 0.7–3.1)
LYMPHOCYTES NFR BLD AUTO: 21.6 % (ref 19.6–45.3)
MCH RBC QN AUTO: 30.9 PG (ref 26.6–33)
MCHC RBC AUTO-ENTMCNC: 32.5 G/DL (ref 31.5–35.7)
MCV RBC AUTO: 94.9 FL (ref 79–97)
MONOCYTES # BLD AUTO: 0.48 10*3/MM3 (ref 0.1–0.9)
MONOCYTES NFR BLD AUTO: 6.3 % (ref 5–12)
NEUTROPHILS NFR BLD AUTO: 5.19 10*3/MM3 (ref 1.7–7)
NEUTROPHILS NFR BLD AUTO: 68.4 % (ref 42.7–76)
NRBC BLD AUTO-RTO: 0 /100 WBC (ref 0–0.2)
PLATELET # BLD AUTO: 144 10*3/MM3 (ref 140–450)
PMV BLD AUTO: 11.3 FL (ref 6–12)
POTASSIUM SERPL-SCNC: 4 MMOL/L (ref 3.5–5.2)
PROT SERPL-MCNC: 6.2 G/DL (ref 6–8.5)
RBC # BLD AUTO: 4.31 10*6/MM3 (ref 3.77–5.28)
SODIUM SERPL-SCNC: 140 MMOL/L (ref 136–145)
TRIGL SERPL-MCNC: 50 MG/DL (ref 0–150)
TSH SERPL DL<=0.05 MIU/L-ACNC: 2.31 UIU/ML (ref 0.27–4.2)
VLDLC SERPL-MCNC: 11 MG/DL (ref 5–40)
WBC NRBC COR # BLD AUTO: 7.59 10*3/MM3 (ref 3.4–10.8)

## 2025-04-21 PROCEDURE — 36415 COLL VENOUS BLD VENIPUNCTURE: CPT

## 2025-04-21 PROCEDURE — 80061 LIPID PANEL: CPT

## 2025-04-21 PROCEDURE — 80050 GENERAL HEALTH PANEL: CPT

## 2025-04-22 ENCOUNTER — RESULTS FOLLOW-UP (OUTPATIENT)
Dept: LAB | Facility: HOSPITAL | Age: 50
End: 2025-04-22
Payer: COMMERCIAL

## 2025-04-22 NOTE — TELEPHONE ENCOUNTER
Name: Digna Baron    Relationship: Self    Best Callback Number: [phone]     HUB PROVIDED THE RELAY MESSAGE FROM THE OFFICE   PATIENT HAS FURTHER QUESTIONS AND WOULD LIKE A CALL BACK    ADDITIONAL INFORMATION: PATIENT STATES THAT ONE OF HER CHOLESTEROL LEVELS WAS HIGH BASED ON RESULTS SHE SAW IN MYCHART. PATIENT WANTING TO DISCUSS WITH CLINICAL STAFF.

## 2025-04-22 NOTE — TELEPHONE ENCOUNTER
Spoke with pt regarding cholesterol. It has improved from lst year. Pt can work on diet and exercise but no med changes are needed at this time. Pt voiced understanding.

## 2025-06-23 ENCOUNTER — LAB (OUTPATIENT)
Dept: ONCOLOGY | Facility: HOSPITAL | Age: 50
End: 2025-06-23
Payer: COMMERCIAL

## 2025-06-23 DIAGNOSIS — Z17.0 MALIGNANT NEOPLASM OF LOWER-OUTER QUADRANT OF LEFT BREAST OF FEMALE, ESTROGEN RECEPTOR POSITIVE: ICD-10-CM

## 2025-06-23 DIAGNOSIS — C50.512 MALIGNANT NEOPLASM OF LOWER-OUTER QUADRANT OF LEFT BREAST OF FEMALE, ESTROGEN RECEPTOR POSITIVE: ICD-10-CM

## 2025-06-23 LAB
ALBUMIN SERPL-MCNC: 4 G/DL (ref 3.5–5.2)
ALBUMIN/GLOB SERPL: 1.6 G/DL
ALP SERPL-CCNC: 51 U/L (ref 39–117)
ALT SERPL W P-5'-P-CCNC: 23 U/L (ref 1–33)
ANION GAP SERPL CALCULATED.3IONS-SCNC: 11.2 MMOL/L (ref 5–15)
AST SERPL-CCNC: 29 U/L (ref 1–32)
BASOPHILS # BLD AUTO: 0.06 10*3/MM3 (ref 0–0.2)
BASOPHILS NFR BLD AUTO: 0.6 % (ref 0–1.5)
BILIRUB SERPL-MCNC: 0.3 MG/DL (ref 0–1.2)
BUN SERPL-MCNC: 13.2 MG/DL (ref 6–20)
BUN/CREAT SERPL: 15.3 (ref 7–25)
CALCIUM SPEC-SCNC: 8.6 MG/DL (ref 8.6–10.5)
CHLORIDE SERPL-SCNC: 104 MMOL/L (ref 98–107)
CO2 SERPL-SCNC: 21.8 MMOL/L (ref 22–29)
CREAT SERPL-MCNC: 0.86 MG/DL (ref 0.57–1)
DEPRECATED RDW RBC AUTO: 44.1 FL (ref 37–54)
EGFRCR SERPLBLD CKD-EPI 2021: 82.4 ML/MIN/1.73
EOSINOPHIL # BLD AUTO: 0.24 10*3/MM3 (ref 0–0.4)
EOSINOPHIL NFR BLD AUTO: 2.4 % (ref 0.3–6.2)
ERYTHROCYTE [DISTWIDTH] IN BLOOD BY AUTOMATED COUNT: 13.2 % (ref 12.3–15.4)
GLOBULIN UR ELPH-MCNC: 2.5 GM/DL
GLUCOSE SERPL-MCNC: 97 MG/DL (ref 65–99)
HCT VFR BLD AUTO: 36.4 % (ref 34–46.6)
HGB BLD-MCNC: 12.4 G/DL (ref 12–15.9)
IMM GRANULOCYTES # BLD AUTO: 0.03 10*3/MM3 (ref 0–0.05)
IMM GRANULOCYTES NFR BLD AUTO: 0.3 % (ref 0–0.5)
LYMPHOCYTES # BLD AUTO: 2.5 10*3/MM3 (ref 0.7–3.1)
LYMPHOCYTES NFR BLD AUTO: 24.8 % (ref 19.6–45.3)
MCH RBC QN AUTO: 31.3 PG (ref 26.6–33)
MCHC RBC AUTO-ENTMCNC: 34.1 G/DL (ref 31.5–35.7)
MCV RBC AUTO: 91.9 FL (ref 79–97)
MONOCYTES # BLD AUTO: 0.49 10*3/MM3 (ref 0.1–0.9)
MONOCYTES NFR BLD AUTO: 4.9 % (ref 5–12)
NEUTROPHILS NFR BLD AUTO: 6.77 10*3/MM3 (ref 1.7–7)
NEUTROPHILS NFR BLD AUTO: 67 % (ref 42.7–76)
NRBC BLD AUTO-RTO: 0 /100 WBC (ref 0–0.2)
PLATELET # BLD AUTO: 232 10*3/MM3 (ref 140–450)
PMV BLD AUTO: 9.8 FL (ref 6–12)
POTASSIUM SERPL-SCNC: 4 MMOL/L (ref 3.5–5.2)
PROT SERPL-MCNC: 6.5 G/DL (ref 6–8.5)
RBC # BLD AUTO: 3.96 10*6/MM3 (ref 3.77–5.28)
SODIUM SERPL-SCNC: 137 MMOL/L (ref 136–145)
WBC NRBC COR # BLD AUTO: 10.09 10*3/MM3 (ref 3.4–10.8)

## 2025-06-23 PROCEDURE — 80053 COMPREHEN METABOLIC PANEL: CPT

## 2025-06-23 PROCEDURE — 36415 COLL VENOUS BLD VENIPUNCTURE: CPT

## 2025-06-23 PROCEDURE — 85025 COMPLETE CBC W/AUTO DIFF WBC: CPT

## 2025-06-26 DIAGNOSIS — G43.809 OTHER MIGRAINE WITHOUT STATUS MIGRAINOSUS, NOT INTRACTABLE: ICD-10-CM

## 2025-06-27 ENCOUNTER — TELEMEDICINE (OUTPATIENT)
Dept: ONCOLOGY | Facility: HOSPITAL | Age: 50
End: 2025-06-27
Payer: COMMERCIAL

## 2025-06-27 DIAGNOSIS — C50.512 MALIGNANT NEOPLASM OF LOWER-OUTER QUADRANT OF LEFT BREAST OF FEMALE, ESTROGEN RECEPTOR POSITIVE: Primary | ICD-10-CM

## 2025-06-27 DIAGNOSIS — Z17.0 MALIGNANT NEOPLASM OF LOWER-OUTER QUADRANT OF LEFT BREAST OF FEMALE, ESTROGEN RECEPTOR POSITIVE: Primary | ICD-10-CM

## 2025-06-27 RX ORDER — SUMATRIPTAN 50 MG/1
TABLET, FILM COATED ORAL
Qty: 9 TABLET | Refills: 4 | Status: SHIPPED | OUTPATIENT
Start: 2025-06-27

## 2025-06-27 NOTE — PROGRESS NOTES
Chief Complaint/Care Team   Pt here to follow up regarding breast cancer    Brenda Ying, AP*  Brenda Ying, APRN    TELEHEALTH DISCLAIMER  You have chosen to receive care through a video telehealth visit. Do you consent to use a video telehealth visit for your medical care today?  YES  This visit has been scheduled or rescheduled as a video telehealth visit to comply with patient safety & health concerns in accordance with CDC recommendations.    Total time of discussion was 4 minutes.   I was located at [facility] clinic and pt was located at home.    History of Present Illness     Diagnosis: ER+ Left Breast Cancer    Current Treatment: Tamoxifen which began in 6/2019  Previous Treatment:   Hem/Onc History:  ER+ Left Breast Cancer:  - diagnosed at age 44  - Bilateral skin sparing mastectomy and left SLNB on 12/21/18: invastive ductal carcinoma of the left breast, grade 2, 12mm focus, negative margins, closest was 4mm, not associated with DCIS, 1 of 2 LN with micrometastatic disease, pT1c pN1mi (stage IA), ER+, OR+, HER2 negative  - treatment with adjuvant chemotherapy with TC but no radiation.   - on Tamoxifen since 2019  -pt transitioned care to Dr. Ervin on 3/5/2024  -Patient underwent breast cancer index testing in 2024 which indicated benefit for extended endocrine therapy, thus patient due to stop tamoxifen in July 1, 2029    Interval History:  Digna Baron is a 50 y.o. female who presents to [facility] for ER+ Left Breast Cancer, she is currently receiving treatment with tamoxifen, which she began in 6/2024. She shares that she is tolerating this medication well. No report of any vaginal bleeding. She reports compliance with zoloft and improvement in mood as a result. No breast concerns reported today.    Review of Systems   Constitutional:  Negative for appetite change, diaphoresis, fatigue, fever, unexpected weight gain and unexpected weight loss.    HENT:  Negative for hearing loss, mouth sores, sore throat, swollen glands, trouble swallowing and voice change.    Eyes:  Negative for blurred vision.   Respiratory:  Negative for cough, shortness of breath and wheezing.    Cardiovascular:  Negative for chest pain and palpitations.   Gastrointestinal:  Negative for abdominal pain, blood in stool, constipation, diarrhea, nausea and vomiting.   Endocrine: Negative for cold intolerance and heat intolerance.   Genitourinary:  Negative for difficulty urinating, dysuria, frequency, hematuria and urinary incontinence.   Musculoskeletal:  Negative for arthralgias, back pain and myalgias.   Skin:  Negative for rash, skin lesions and wound.   Neurological:  Negative for dizziness, seizures, weakness, numbness and headache.   Hematological:  Does not bruise/bleed easily.   Psychiatric/Behavioral:  Negative for depressed mood. The patient is not nervous/anxious.    All other systems reviewed and are negative.       Oncology/Hematology History Overview Note     ER+ Left Breast Cancer:    - diagnosed at age 44  - Bilateral skin sparing mastectomy and left SLNB on 12/21/18: invastive ductal carcinoma of the left breast, grade 2, 12mm focus, negative margins, closest was 4mm, not associated with DCIS, 1 of 2 LN with micrometastatic disease, pT1c pN1mi (stage IA), ER+, MS+, HER2 negative  - treatment with adjuvant chemotherapy with TC but no radiation.   - on Tamoxifen since 2019     Malignant neoplasm metastatic to lymph nodes   12/28/2018 Initial Diagnosis    Malignant neoplasm metastatic to lymph nodes (HCC)     Malignant neoplasm of lower-outer quadrant of left female breast   12/6/2018 Initial Diagnosis    Malignant neoplasm of lower-outer quadrant of left female breast (HCC)         Objective     There were no vitals filed for this visit.          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    Varicella     As a child     Current Outpatient Medications on File Prior to Visit   Medication Sig Dispense Refill    Ferrous Sulfate Dried (FERROUS SULFATE IRON PO) Take  by mouth.      fluticasone (FLONASE) 50 MCG/ACT nasal spray Administer 2 sprays into the nostril(s) as directed by provider Daily. 18.2 mL 0    multivitamin with minerals tablet tablet Take 1 tablet by mouth Daily.      Omega-3 Fatty Acids (fish oil) 1000 MG capsule capsule Take  by mouth Daily With Breakfast.      sertraline (ZOLOFT) 25 MG tablet Take 3 tablets by mouth Daily. 270 tablet 1    SUMAtriptan (IMITREX) 50 MG tablet Take one tablet at onset of headache. May repeat dose one time in 2 hours if headache not relieved. 9 tablet 4    tamoxifen (NOLVADEX) 10 MG tablet Take 2 tablets by mouth Daily. 180 tablet 1    Tirzepatide-Weight Management (ZEPBOUND SC) Inject  under the skin into the appropriate area as directed. Weight Watchers      vitamin B-12 (CYANOCOBALAMIN) 1000 MCG tablet Take 1 tablet by mouth Daily.      vitamin C (ASCORBIC ACID) 250 MG tablet Take 1 tablet by mouth Daily.      Zinc Acetate, Oral, (ZINC ACETATE PO) Take  by mouth.       No current facility-administered medications on file prior to visit.      No Known Allergies  Past Surgical History:   Procedure Laterality Date    BREAST BIOPSY      BREAST SURGERY  2018    Bilateral Mastectomy and implant exchange in May 2019     SECTION  1996    Daughter's Birth    COLONOSCOPY N/A  2023    Procedure: COLONOSCOPY;  Surgeon: Tylor Bahena MD;  Location: [facility] ENDOSCOPY;  Service: General;  Laterality: N/A;  normal colonoscopy    LYMPH NODE BIOPSY  2018    Breast cancer    MASTECTOMY  2018    Bilateral due to breast cancer    US GUIDED LYMPH NODE BIOPSY  08/10/2023    WISDOM TOOTH EXTRACTION       Social History     Socioeconomic History    Marital status:    Tobacco Use    Smoking status: Former     Current packs/day: 0.00     Average packs/day: 1 pack/day for 20.2 years (20.2 ttl pk-yrs)     Types: Cigarettes     Start date: 1992     Quit date: 2012     Years since quittin.9    Smokeless tobacco: Never   Vaping Use    Vaping status: Never Used   Substance and Sexual Activity    Alcohol use: Not Currently     Comment: Heavy    Drug use: Yes     Types: Hashish, Marijuana, Methamphetamines     Comment: 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 He's been clean most of my life    Cancer Maternal Grandfather         Bladder cancer    Diabetes Maternal Grandfather         Extreme kidney failure. One at 0% function    Heart disease Maternal Grandfather         Heart attack when he was 50    Kidney disease Maternal Grandfather     Cancer Paternal Grandmother         Breast cancer    Breast cancer Paternal Grandmother     Cancer Paternal Aunt         Gall bladder cancer    Early death Paternal Aunt         Passed from the cancer at 41 yo    Thyroid disease Daughter         Hypothyroidism       Results     Result Review   The following data was reviewed by: Johnathon Ervin MD   Lab Results   Component Value Date    HGB 12.4 2025    HCT 36.4 2025    MCV 91.9 2025     2025    WBC 10.09 2025    NEUTROABS 6.77 2025    LYMPHSABS  2.50 06/23/2025    MONOSABS 0.49 06/23/2025    EOSABS 0.24 06/23/2025    BASOSABS 0.06 06/23/2025     Lab Results   Component Value Date    GLUCOSE 97 06/23/2025    BUN 13.2 06/23/2025    CREATININE 0.86 06/23/2025     06/23/2025    K 4.0 06/23/2025     06/23/2025    CO2 21.8 (L) 06/23/2025    CALCIUM 8.6 06/23/2025    PROTEINTOT 6.5 06/23/2025    ALBUMIN 4.0 06/23/2025    BILITOT 0.3 06/23/2025    ALKPHOS 51 06/23/2025    AST 29 06/23/2025    ALT 23 06/23/2025     Lab Results   Component Value Date    FREET4 1.07 10/02/2023    TSH 2.310 04/21/2025           No radiology results for the last day       Assessment & Plan     Diagnoses and all orders for this visit:    1. Malignant neoplasm of lower-outer quadrant of left breast of female, estrogen receptor positive (Primary)  -     CBC & Differential; Future  -     Comprehensive Metabolic Panel; Future      Digna Baron is a 50 y.o. female who presents to [facility]EMAT[facility]Y [facility] ON[facility]Y for ER+ Left Breast Cancer, she is currently receiving treatment with tamoxifen,    -Results of most recent US of breast was negative from 3/1/2024, no breast concerns reported today  -pt is s/p mastectomy   -Patient underwent breast cancer index (BCI) testing in 2024 which indicated benefit for extended endocrine therapy, thus patient due to stop tamoxifen in July 1, 2029 6/28/2025- discussed results of most recent CBC and CMP, overall pt tolerating tamoxifen well, recommend continuing tamoxifen,pt to notify me for refills      H/o Back pain  -pt underwent MRI spine ordered by PCP and I ordered NM bone scan in 3/2024 to assess for disease recurrence, both were negative for metastatic disease in 3/2024  - no bone pain reported today    -plan for pt follow up in 6 months with labs CBC, CMP.    Please note that portions of this note were completed with a voice recognition program.    Electronically signed by Johnathon Ervin MD, 06/28/25, 12:16 PM  EDT.          Follow Up     I spent 30 minutes caring for Digna on this date of service. This time includes time spent by me in the following activities:preparing for the visit, reviewing tests, obtaining and/or reviewing a separately obtained history, performing a medically appropriate examination and/or evaluation , counseling and educating the patient/family/caregiver, ordering medications, tests, or procedures, referring and communicating with other health care professionals , documenting information in the medical record, independently interpreting results and communicating that information with the patient/family/caregiver, and care coordination.      The plan was discussed with the patient and/or family. The patient was given time to ask questions and these questions were answered. At the conclusion of their visit they had no additional questions or concerns and all questions were answered to their satisfaction.    Patient was given instructions and counseling regarding her condition or for health maintenance advice. Please see specific information pulled into the AVS if appropriate.

## 2025-06-30 ENCOUNTER — TELEPHONE (OUTPATIENT)
Dept: ONCOLOGY | Facility: HOSPITAL | Age: 50
End: 2025-06-30
Payer: COMMERCIAL

## 2025-07-19 DIAGNOSIS — Z17.0 MALIGNANT NEOPLASM OF LOWER-OUTER QUADRANT OF LEFT BREAST OF FEMALE, ESTROGEN RECEPTOR POSITIVE: ICD-10-CM

## 2025-07-19 DIAGNOSIS — C50.512 MALIGNANT NEOPLASM OF LOWER-OUTER QUADRANT OF LEFT BREAST OF FEMALE, ESTROGEN RECEPTOR POSITIVE: ICD-10-CM

## 2025-07-19 DIAGNOSIS — Z85.3 HISTORY OF BREAST CANCER: ICD-10-CM

## 2025-07-21 RX ORDER — TAMOXIFEN CITRATE 10 MG/1
20 TABLET ORAL DAILY
Qty: 90 TABLET | Refills: 1 | Status: SHIPPED | OUTPATIENT
Start: 2025-07-21

## 2025-08-25 ENCOUNTER — OFFICE VISIT (OUTPATIENT)
Dept: FAMILY MEDICINE CLINIC | Facility: CLINIC | Age: 50
End: 2025-08-25
Payer: COMMERCIAL

## 2025-08-25 VITALS
HEART RATE: 66 BPM | HEIGHT: 65 IN | SYSTOLIC BLOOD PRESSURE: 108 MMHG | DIASTOLIC BLOOD PRESSURE: 71 MMHG | BODY MASS INDEX: 26.49 KG/M2 | OXYGEN SATURATION: 98 % | WEIGHT: 159 LBS

## 2025-08-25 DIAGNOSIS — F32.A ANXIETY AND DEPRESSION: ICD-10-CM

## 2025-08-25 DIAGNOSIS — Z87.891 HISTORY OF NICOTINE USE: Primary | ICD-10-CM

## 2025-08-25 DIAGNOSIS — F41.9 ANXIETY AND DEPRESSION: ICD-10-CM

## 2025-08-25 DIAGNOSIS — G43.809 OTHER MIGRAINE WITHOUT STATUS MIGRAINOSUS, NOT INTRACTABLE: ICD-10-CM

## 2025-08-25 PROCEDURE — 99214 OFFICE O/P EST MOD 30 MIN: CPT | Performed by: NURSE PRACTITIONER

## 2025-08-25 RX ORDER — SUMATRIPTAN 50 MG/1
TABLET, FILM COATED ORAL
Qty: 9 TABLET | Refills: 4 | Status: SHIPPED | OUTPATIENT
Start: 2025-08-25

## 2025-08-25 RX ORDER — SERTRALINE HYDROCHLORIDE 25 MG/1
75 TABLET, FILM COATED ORAL DAILY
Qty: 270 TABLET | Refills: 1 | Status: SHIPPED | OUTPATIENT
Start: 2025-08-25

## (undated) DEVICE — SOLIDIFIER LIQLOC PLS 1500CC BT

## (undated) DEVICE — CONN JET HYDRA H20 AUXILIARY DISP

## (undated) DEVICE — SOL IRRG H2O PL/BG 1000ML STRL

## (undated) DEVICE — LINER SURG CANSTR SXN S/RIGD 1500CC

## (undated) DEVICE — Device: Brand: DEFENDO AIR/WATER/SUCTION AND BIOPSY VALVE

## (undated) DEVICE — SOL IRR NACL 0.9PCT BT 1000ML

## (undated) DEVICE — Device